# Patient Record
Sex: MALE | Race: BLACK OR AFRICAN AMERICAN | NOT HISPANIC OR LATINO | Employment: STUDENT | URBAN - METROPOLITAN AREA
[De-identification: names, ages, dates, MRNs, and addresses within clinical notes are randomized per-mention and may not be internally consistent; named-entity substitution may affect disease eponyms.]

---

## 2023-08-13 ENCOUNTER — HOSPITAL ENCOUNTER (OUTPATIENT)
Facility: HOSPITAL | Age: 33
Discharge: HOME | DRG: 897 | End: 2023-08-16
Attending: EMERGENCY MEDICINE | Admitting: STUDENT IN AN ORGANIZED HEALTH CARE EDUCATION/TRAINING PROGRAM
Payer: MEDICAID

## 2023-08-13 DIAGNOSIS — F10.939 ALCOHOL WITHDRAWAL: ICD-10-CM

## 2023-08-13 DIAGNOSIS — Z78.9 ALCOHOL USE: Primary | ICD-10-CM

## 2023-08-13 DIAGNOSIS — R07.9 CHEST PAIN: ICD-10-CM

## 2023-08-13 LAB
ALBUMIN SERPL BCP-MCNC: 4.3 G/DL (ref 3.5–5.2)
ALP SERPL-CCNC: 48 U/L (ref 55–135)
ALT SERPL W/O P-5'-P-CCNC: 54 U/L (ref 10–44)
AMMONIA PLAS-SCNC: 45 UMOL/L (ref 10–50)
ANION GAP SERPL CALC-SCNC: 20 MMOL/L (ref 8–16)
AST SERPL-CCNC: 75 U/L (ref 10–40)
BASOPHILS # BLD AUTO: 0.13 K/UL (ref 0–0.2)
BASOPHILS NFR BLD: 1.5 % (ref 0–1.9)
BILIRUB SERPL-MCNC: 0.9 MG/DL (ref 0.1–1)
BUN SERPL-MCNC: 13 MG/DL (ref 6–20)
CALCIUM SERPL-MCNC: 8.6 MG/DL (ref 8.7–10.5)
CHLORIDE SERPL-SCNC: 108 MMOL/L (ref 95–110)
CO2 SERPL-SCNC: 18 MMOL/L (ref 23–29)
CREAT SERPL-MCNC: 1.2 MG/DL (ref 0.5–1.4)
CTP QC/QA: YES
DIFFERENTIAL METHOD: ABNORMAL
EOSINOPHIL # BLD AUTO: 0.1 K/UL (ref 0–0.5)
EOSINOPHIL NFR BLD: 0.6 % (ref 0–8)
ERYTHROCYTE [DISTWIDTH] IN BLOOD BY AUTOMATED COUNT: 13.9 % (ref 11.5–14.5)
EST. GFR  (NO RACE VARIABLE): >60 ML/MIN/1.73 M^2
GLUCOSE SERPL-MCNC: 105 MG/DL (ref 70–110)
HCT VFR BLD AUTO: 43.3 % (ref 40–54)
HGB BLD-MCNC: 14.4 G/DL (ref 14–18)
IMM GRANULOCYTES # BLD AUTO: 0.01 K/UL (ref 0–0.04)
IMM GRANULOCYTES NFR BLD AUTO: 0.1 % (ref 0–0.5)
LYMPHOCYTES # BLD AUTO: 3 K/UL (ref 1–4.8)
LYMPHOCYTES NFR BLD: 34.1 % (ref 18–48)
MAGNESIUM SERPL-MCNC: 2.1 MG/DL (ref 1.6–2.6)
MCH RBC QN AUTO: 31.4 PG (ref 27–31)
MCHC RBC AUTO-ENTMCNC: 33.3 G/DL (ref 32–36)
MCV RBC AUTO: 94 FL (ref 82–98)
MONOCYTES # BLD AUTO: 1.1 K/UL (ref 0.3–1)
MONOCYTES NFR BLD: 12.5 % (ref 4–15)
NEUTROPHILS # BLD AUTO: 4.5 K/UL (ref 1.8–7.7)
NEUTROPHILS NFR BLD: 51.2 % (ref 38–73)
NRBC BLD-RTO: 0 /100 WBC
PLATELET # BLD AUTO: 274 K/UL (ref 150–450)
PMV BLD AUTO: 9.9 FL (ref 9.2–12.9)
POTASSIUM SERPL-SCNC: 4.1 MMOL/L (ref 3.5–5.1)
PROT SERPL-MCNC: 7.4 G/DL (ref 6–8.4)
RBC # BLD AUTO: 4.59 M/UL (ref 4.6–6.2)
SARS-COV-2 RDRP RESP QL NAA+PROBE: NEGATIVE
SODIUM SERPL-SCNC: 146 MMOL/L (ref 136–145)
WBC # BLD AUTO: 8.71 K/UL (ref 3.9–12.7)

## 2023-08-13 PROCEDURE — 82140 ASSAY OF AMMONIA: CPT | Performed by: EMERGENCY MEDICINE

## 2023-08-13 PROCEDURE — 63600175 PHARM REV CODE 636 W HCPCS: Performed by: STUDENT IN AN ORGANIZED HEALTH CARE EDUCATION/TRAINING PROGRAM

## 2023-08-13 PROCEDURE — 25000003 PHARM REV CODE 250: Performed by: STUDENT IN AN ORGANIZED HEALTH CARE EDUCATION/TRAINING PROGRAM

## 2023-08-13 PROCEDURE — 96361 HYDRATE IV INFUSION ADD-ON: CPT

## 2023-08-13 PROCEDURE — 99285 EMERGENCY DEPT VISIT HI MDM: CPT | Mod: 25

## 2023-08-13 PROCEDURE — 63600175 PHARM REV CODE 636 W HCPCS: Performed by: EMERGENCY MEDICINE

## 2023-08-13 PROCEDURE — 99291 CRITICAL CARE FIRST HOUR: CPT

## 2023-08-13 PROCEDURE — 85025 COMPLETE CBC W/AUTO DIFF WBC: CPT | Performed by: EMERGENCY MEDICINE

## 2023-08-13 PROCEDURE — 83735 ASSAY OF MAGNESIUM: CPT | Performed by: EMERGENCY MEDICINE

## 2023-08-13 PROCEDURE — 96374 THER/PROPH/DIAG INJ IV PUSH: CPT

## 2023-08-13 PROCEDURE — G0378 HOSPITAL OBSERVATION PER HR: HCPCS

## 2023-08-13 PROCEDURE — 80053 COMPREHEN METABOLIC PANEL: CPT | Performed by: EMERGENCY MEDICINE

## 2023-08-13 PROCEDURE — 96372 THER/PROPH/DIAG INJ SC/IM: CPT | Performed by: STUDENT IN AN ORGANIZED HEALTH CARE EDUCATION/TRAINING PROGRAM

## 2023-08-13 PROCEDURE — 87635 SARS-COV-2 COVID-19 AMP PRB: CPT | Performed by: EMERGENCY MEDICINE

## 2023-08-13 PROCEDURE — 12000002 HC ACUTE/MED SURGE SEMI-PRIVATE ROOM

## 2023-08-13 PROCEDURE — 96375 TX/PRO/DX INJ NEW DRUG ADDON: CPT

## 2023-08-13 RX ORDER — TALC
6 POWDER (GRAM) TOPICAL NIGHTLY PRN
Status: DISCONTINUED | OUTPATIENT
Start: 2023-08-13 | End: 2023-08-16 | Stop reason: HOSPADM

## 2023-08-13 RX ORDER — SIMETHICONE 80 MG
1 TABLET,CHEWABLE ORAL 4 TIMES DAILY PRN
Status: DISCONTINUED | OUTPATIENT
Start: 2023-08-13 | End: 2023-08-16 | Stop reason: HOSPADM

## 2023-08-13 RX ORDER — LORAZEPAM 1 MG/1
2 TABLET ORAL EVERY 4 HOURS PRN
Status: DISCONTINUED | OUTPATIENT
Start: 2023-08-13 | End: 2023-08-16 | Stop reason: HOSPADM

## 2023-08-13 RX ORDER — HEPARIN SODIUM 5000 [USP'U]/ML
5000 INJECTION, SOLUTION INTRAVENOUS; SUBCUTANEOUS EVERY 8 HOURS
Status: DISCONTINUED | OUTPATIENT
Start: 2023-08-13 | End: 2023-08-16 | Stop reason: HOSPADM

## 2023-08-13 RX ORDER — FOLIC ACID 1 MG/1
1 TABLET ORAL DAILY
Status: DISCONTINUED | OUTPATIENT
Start: 2023-08-14 | End: 2023-08-16 | Stop reason: HOSPADM

## 2023-08-13 RX ORDER — NALOXONE HCL 0.4 MG/ML
0.02 VIAL (ML) INJECTION
Status: DISCONTINUED | OUTPATIENT
Start: 2023-08-13 | End: 2023-08-16 | Stop reason: HOSPADM

## 2023-08-13 RX ORDER — SODIUM CHLORIDE 0.9 % (FLUSH) 0.9 %
10 SYRINGE (ML) INJECTION EVERY 12 HOURS PRN
Status: DISCONTINUED | OUTPATIENT
Start: 2023-08-13 | End: 2023-08-16 | Stop reason: HOSPADM

## 2023-08-13 RX ORDER — POLYETHYLENE GLYCOL 3350 17 G/17G
17 POWDER, FOR SOLUTION ORAL DAILY
Status: DISCONTINUED | OUTPATIENT
Start: 2023-08-14 | End: 2023-08-16 | Stop reason: HOSPADM

## 2023-08-13 RX ORDER — DIAZEPAM 5 MG/1
5 TABLET ORAL EVERY 8 HOURS
Status: DISCONTINUED | OUTPATIENT
Start: 2023-08-13 | End: 2023-08-14

## 2023-08-13 RX ORDER — IBUPROFEN 200 MG
24 TABLET ORAL
Status: DISCONTINUED | OUTPATIENT
Start: 2023-08-13 | End: 2023-08-16 | Stop reason: HOSPADM

## 2023-08-13 RX ORDER — ACETAMINOPHEN 325 MG/1
650 TABLET ORAL EVERY 4 HOURS PRN
Status: DISCONTINUED | OUTPATIENT
Start: 2023-08-13 | End: 2023-08-16 | Stop reason: HOSPADM

## 2023-08-13 RX ORDER — IBUPROFEN 200 MG
16 TABLET ORAL
Status: DISCONTINUED | OUTPATIENT
Start: 2023-08-13 | End: 2023-08-16 | Stop reason: HOSPADM

## 2023-08-13 RX ORDER — LORAZEPAM 2 MG/ML
2 INJECTION INTRAMUSCULAR
Status: DISCONTINUED | OUTPATIENT
Start: 2023-08-13 | End: 2023-08-16 | Stop reason: HOSPADM

## 2023-08-13 RX ORDER — GLUCAGON 1 MG
1 KIT INJECTION
Status: DISCONTINUED | OUTPATIENT
Start: 2023-08-13 | End: 2023-08-16 | Stop reason: HOSPADM

## 2023-08-13 RX ORDER — ONDANSETRON 2 MG/ML
4 INJECTION INTRAMUSCULAR; INTRAVENOUS EVERY 8 HOURS PRN
Status: DISCONTINUED | OUTPATIENT
Start: 2023-08-13 | End: 2023-08-16 | Stop reason: HOSPADM

## 2023-08-13 RX ORDER — LORAZEPAM 2 MG/ML
2 INJECTION INTRAMUSCULAR
Status: COMPLETED | OUTPATIENT
Start: 2023-08-13 | End: 2023-08-13

## 2023-08-13 RX ORDER — THIAMINE HCL 100 MG
100 TABLET ORAL 3 TIMES DAILY
Status: DISCONTINUED | OUTPATIENT
Start: 2023-08-17 | End: 2023-08-16 | Stop reason: HOSPADM

## 2023-08-13 RX ADMIN — SODIUM CHLORIDE, POTASSIUM CHLORIDE, SODIUM LACTATE AND CALCIUM CHLORIDE 1000 ML: 600; 310; 30; 20 INJECTION, SOLUTION INTRAVENOUS at 07:08

## 2023-08-13 RX ADMIN — DIAZEPAM 5 MG: 5 TABLET ORAL at 09:08

## 2023-08-13 RX ADMIN — LORAZEPAM 2 MG: 2 INJECTION INTRAMUSCULAR; INTRAVENOUS at 07:08

## 2023-08-13 RX ADMIN — HEPARIN SODIUM 5000 UNITS: 5000 INJECTION INTRAVENOUS; SUBCUTANEOUS at 09:08

## 2023-08-13 NOTE — Clinical Note
Diagnosis: Alcohol withdrawal [291.81.ICD-9-CM]   Future Attending Provider: ANTONIA CAAL [[id number]]   Admitting Provider:: NALINI LUJAN [[id number]]

## 2023-08-14 PROBLEM — Z78.9 ALCOHOL USE: Status: ACTIVE | Noted: 2023-08-14

## 2023-08-14 PROBLEM — F10.939 ALCOHOL WITHDRAWAL: Chronic | Status: ACTIVE | Noted: 2023-08-14

## 2023-08-14 PROBLEM — F10.90 ALCOHOL USE DISORDER: Status: ACTIVE | Noted: 2023-08-14

## 2023-08-14 PROBLEM — R74.8 ELEVATED LIVER ENZYMES: Status: ACTIVE | Noted: 2023-08-14

## 2023-08-14 PROBLEM — F10.939 ALCOHOL WITHDRAWAL: Status: ACTIVE | Noted: 2023-08-14

## 2023-08-14 PROBLEM — E87.0 HYPERNATREMIA: Status: ACTIVE | Noted: 2023-08-14

## 2023-08-14 PROBLEM — E87.29 METABOLIC ACIDOSIS, INCREASED ANION GAP: Status: ACTIVE | Noted: 2023-08-14

## 2023-08-14 LAB
ALBUMIN SERPL BCP-MCNC: 4 G/DL (ref 3.5–5.2)
ALP SERPL-CCNC: 55 U/L (ref 55–135)
ALT SERPL W/O P-5'-P-CCNC: 72 U/L (ref 10–44)
ANION GAP SERPL CALC-SCNC: 15 MMOL/L (ref 8–16)
AST SERPL-CCNC: 139 U/L (ref 10–40)
BASOPHILS # BLD AUTO: 0.11 K/UL (ref 0–0.2)
BASOPHILS NFR BLD: 1.2 % (ref 0–1.9)
BILIRUB SERPL-MCNC: 1.3 MG/DL (ref 0.1–1)
BUN SERPL-MCNC: 11 MG/DL (ref 6–20)
CALCIUM SERPL-MCNC: 9 MG/DL (ref 8.7–10.5)
CHLORIDE SERPL-SCNC: 106 MMOL/L (ref 95–110)
CO2 SERPL-SCNC: 23 MMOL/L (ref 23–29)
CREAT SERPL-MCNC: 1.1 MG/DL (ref 0.5–1.4)
DIFFERENTIAL METHOD: ABNORMAL
EOSINOPHIL # BLD AUTO: 0.2 K/UL (ref 0–0.5)
EOSINOPHIL NFR BLD: 2.5 % (ref 0–8)
ERYTHROCYTE [DISTWIDTH] IN BLOOD BY AUTOMATED COUNT: 13.6 % (ref 11.5–14.5)
EST. GFR  (NO RACE VARIABLE): >60 ML/MIN/1.73 M^2
GLUCOSE SERPL-MCNC: 83 MG/DL (ref 70–110)
HCT VFR BLD AUTO: 41.8 % (ref 40–54)
HGB BLD-MCNC: 14 G/DL (ref 14–18)
IMM GRANULOCYTES # BLD AUTO: 0.01 K/UL (ref 0–0.04)
IMM GRANULOCYTES NFR BLD AUTO: 0.1 % (ref 0–0.5)
LYMPHOCYTES # BLD AUTO: 3 K/UL (ref 1–4.8)
LYMPHOCYTES NFR BLD: 33.6 % (ref 18–48)
MCH RBC QN AUTO: 31.2 PG (ref 27–31)
MCHC RBC AUTO-ENTMCNC: 33.5 G/DL (ref 32–36)
MCV RBC AUTO: 93 FL (ref 82–98)
MONOCYTES # BLD AUTO: 1 K/UL (ref 0.3–1)
MONOCYTES NFR BLD: 11.2 % (ref 4–15)
NEUTROPHILS # BLD AUTO: 4.6 K/UL (ref 1.8–7.7)
NEUTROPHILS NFR BLD: 51.4 % (ref 38–73)
NRBC BLD-RTO: 0 /100 WBC
PLATELET # BLD AUTO: 273 K/UL (ref 150–450)
PMV BLD AUTO: 10 FL (ref 9.2–12.9)
POTASSIUM SERPL-SCNC: 3.6 MMOL/L (ref 3.5–5.1)
PROT SERPL-MCNC: 6.9 G/DL (ref 6–8.4)
RBC # BLD AUTO: 4.49 M/UL (ref 4.6–6.2)
SODIUM SERPL-SCNC: 144 MMOL/L (ref 136–145)
WBC # BLD AUTO: 8.92 K/UL (ref 3.9–12.7)

## 2023-08-14 PROCEDURE — 25000003 PHARM REV CODE 250: Performed by: STUDENT IN AN ORGANIZED HEALTH CARE EDUCATION/TRAINING PROGRAM

## 2023-08-14 PROCEDURE — 96366 THER/PROPH/DIAG IV INF ADDON: CPT

## 2023-08-14 PROCEDURE — 96372 THER/PROPH/DIAG INJ SC/IM: CPT | Mod: 59 | Performed by: STUDENT IN AN ORGANIZED HEALTH CARE EDUCATION/TRAINING PROGRAM

## 2023-08-14 PROCEDURE — 99291 CRITICAL CARE FIRST HOUR: CPT

## 2023-08-14 PROCEDURE — 99205 OFFICE O/P NEW HI 60 MIN: CPT | Mod: 95,AF,HB, | Performed by: PSYCHIATRY & NEUROLOGY

## 2023-08-14 PROCEDURE — 63600175 PHARM REV CODE 636 W HCPCS: Performed by: STUDENT IN AN ORGANIZED HEALTH CARE EDUCATION/TRAINING PROGRAM

## 2023-08-14 PROCEDURE — 80053 COMPREHEN METABOLIC PANEL: CPT | Performed by: STUDENT IN AN ORGANIZED HEALTH CARE EDUCATION/TRAINING PROGRAM

## 2023-08-14 PROCEDURE — 96372 THER/PROPH/DIAG INJ SC/IM: CPT | Performed by: STUDENT IN AN ORGANIZED HEALTH CARE EDUCATION/TRAINING PROGRAM

## 2023-08-14 PROCEDURE — 96376 TX/PRO/DX INJ SAME DRUG ADON: CPT

## 2023-08-14 PROCEDURE — 99205 PR OFFICE/OUTPT VISIT, NEW, LEVL V, 60-74 MIN: ICD-10-PCS | Mod: 95,AF,HB, | Performed by: PSYCHIATRY & NEUROLOGY

## 2023-08-14 PROCEDURE — G0378 HOSPITAL OBSERVATION PER HR: HCPCS

## 2023-08-14 PROCEDURE — 11000001 HC ACUTE MED/SURG PRIVATE ROOM

## 2023-08-14 PROCEDURE — 96365 THER/PROPH/DIAG IV INF INIT: CPT

## 2023-08-14 PROCEDURE — S4991 NICOTINE PATCH NONLEGEND: HCPCS | Performed by: STUDENT IN AN ORGANIZED HEALTH CARE EDUCATION/TRAINING PROGRAM

## 2023-08-14 PROCEDURE — 85025 COMPLETE CBC W/AUTO DIFF WBC: CPT | Performed by: STUDENT IN AN ORGANIZED HEALTH CARE EDUCATION/TRAINING PROGRAM

## 2023-08-14 RX ORDER — IBUPROFEN 200 MG
1 TABLET ORAL DAILY
Status: DISCONTINUED | OUTPATIENT
Start: 2023-08-14 | End: 2023-08-16 | Stop reason: HOSPADM

## 2023-08-14 RX ORDER — POTASSIUM CHLORIDE 20 MEQ/1
40 TABLET, EXTENDED RELEASE ORAL ONCE
Status: COMPLETED | OUTPATIENT
Start: 2023-08-14 | End: 2023-08-14

## 2023-08-14 RX ORDER — DIAZEPAM 2 MG/1
2 TABLET ORAL EVERY 8 HOURS
Status: DISCONTINUED | OUTPATIENT
Start: 2023-08-20 | End: 2023-08-16 | Stop reason: HOSPADM

## 2023-08-14 RX ORDER — DIAZEPAM 5 MG/1
10 TABLET ORAL EVERY 8 HOURS
Status: DISCONTINUED | OUTPATIENT
Start: 2023-08-14 | End: 2023-08-16 | Stop reason: HOSPADM

## 2023-08-14 RX ORDER — DIAZEPAM 5 MG/1
5 TABLET ORAL EVERY 8 HOURS
Status: DISCONTINUED | OUTPATIENT
Start: 2023-08-17 | End: 2023-08-16 | Stop reason: HOSPADM

## 2023-08-14 RX ADMIN — HEPARIN SODIUM 5000 UNITS: 5000 INJECTION INTRAVENOUS; SUBCUTANEOUS at 09:08

## 2023-08-14 RX ADMIN — HEPARIN SODIUM 5000 UNITS: 5000 INJECTION INTRAVENOUS; SUBCUTANEOUS at 05:08

## 2023-08-14 RX ADMIN — FOLIC ACID 1 MG: 1 TABLET ORAL at 11:08

## 2023-08-14 RX ADMIN — POLYETHYLENE GLYCOL 3350 17 G: 17 POWDER, FOR SOLUTION ORAL at 11:08

## 2023-08-14 RX ADMIN — NICOTINE 1 PATCH: 21 PATCH, EXTENDED RELEASE TRANSDERMAL at 12:08

## 2023-08-14 RX ADMIN — DIAZEPAM 5 MG: 5 TABLET ORAL at 05:08

## 2023-08-14 RX ADMIN — DIAZEPAM 10 MG: 5 TABLET ORAL at 09:08

## 2023-08-14 RX ADMIN — DIAZEPAM 10 MG: 5 TABLET ORAL at 03:08

## 2023-08-14 RX ADMIN — POTASSIUM CHLORIDE 40 MEQ: 1500 TABLET, EXTENDED RELEASE ORAL at 12:08

## 2023-08-14 RX ADMIN — THERA TABS 1 TABLET: TAB at 11:08

## 2023-08-14 RX ADMIN — THIAMINE HYDROCHLORIDE 250 MG: 100 INJECTION, SOLUTION INTRAMUSCULAR; INTRAVENOUS at 11:08

## 2023-08-14 RX ADMIN — HEPARIN SODIUM 5000 UNITS: 5000 INJECTION INTRAVENOUS; SUBCUTANEOUS at 03:08

## 2023-08-14 RX ADMIN — LORAZEPAM 2 MG: 2 INJECTION INTRAMUSCULAR; INTRAVENOUS at 01:08

## 2023-08-14 RX ADMIN — LORAZEPAM 2 MG: 2 INJECTION INTRAMUSCULAR; INTRAVENOUS at 12:08

## 2023-08-14 NOTE — ED NOTES
RN responded to pts call light. Pt requesting to urinate. RN noted pts tremors to have returned. RN will perform ciwa. Urinal provided.

## 2023-08-14 NOTE — ED PROVIDER NOTES
"Encounter Date: 8/13/2023       History     Chief Complaint   Patient presents with    Delirium Tremens (DTS)     To ed via [facility] from [facility] for eval of tremors and "the shakes". Pt reports daily etoh use. States last drink was this morning. Pt given 1L NS by EMS PTA.      32-year-old male brought in by EMS with tremors.  Patient says that he last drank alcohol around 6 or 7 this morning.  Has not had anything to drink since.  He reports he usually drinks heavily throughout the day, over 1/5 of alcohol daily. Complains of diffuse tremors, nausea, vomiting.  Denies any other drug use.  Denies a prior history of seizures.  Denies any falls or trauma.  Says that he was in rehab most recently a few years ago, currently is interested in quitting.  Says he stopped drinking today because his "body could not take it anymore."    The history is provided by the EMS personnel and the patient. No  was used.     Review of patient's allergies indicates:  No Known Allergies  No past medical history on file.  No past surgical history on file.  No family history on file.     Review of Systems    Physical Exam     Initial Vitals [08/13/23 1838]   BP Pulse Resp Temp SpO2   131/88 80 18 98.2 °F (36.8 °C) 100 %      MAP       --         Physical Exam    Constitutional: He appears well-developed and well-nourished. No distress.   HENT:   Head: Normocephalic and atraumatic.   Eyes: Conjunctivae and EOM are normal. Pupils are equal, round, and reactive to light.   Neck: Neck supple.   Normal range of motion.  Cardiovascular:  Normal rate.           Pulmonary/Chest: No respiratory distress.   Abdominal: Abdomen is soft. He exhibits no distension. There is no abdominal tenderness.   Musculoskeletal:         General: Normal range of motion.      Cervical back: Normal range of motion and neck supple.     Neurological: He is alert and oriented to person, place, and time.   Diffuse tremors   Skin: Skin is warm and dry. "         ED Course   Critical Care    Date/Time: 8/13/2023 7:54 PM    Performed by: Angelina Brink MD  Authorized by: Angelina Brink MD  Total critical care time (exclusive of procedural time) : 30 minutes  Critical care time was exclusive of separately billable procedures and treating other patients.  Critical care was necessary to treat or prevent imminent or life-threatening deterioration of the following conditions: toxidrome.  Critical care was time spent personally by me on the following activities: development of treatment plan with patient or surrogate, interpretation of cardiac output measurements, evaluation of patient's response to treatment, examination of patient, obtaining history from patient or surrogate, ordering and performing treatments and interventions, ordering and review of laboratory studies, ordering and review of radiographic studies, pulse oximetry, re-evaluation of patient's condition and review of old charts.        Labs Reviewed   CBC W/ AUTO DIFFERENTIAL - Abnormal; Notable for the following components:       Result Value    RBC 4.59 (*)     MCH 31.4 (*)     Mono # 1.1 (*)     All other components within normal limits   COMPREHENSIVE METABOLIC PANEL - Abnormal; Notable for the following components:    Sodium 146 (*)     CO2 18 (*)     Calcium 8.6 (*)     Alkaline Phosphatase 48 (*)     AST 75 (*)     ALT 54 (*)     Anion Gap 20 (*)     All other components within normal limits   MAGNESIUM   AMMONIA   SARS-COV-2 RDRP GENE          Imaging Results    None          Medications   lactated ringers bolus 1,000 mL (1,000 mLs Intravenous New Bag 8/13/23 1911)   LORazepam injection 2 mg (2 mg Intravenous Given 8/13/23 1902)     Medical Decision Making:   Differential Diagnosis:   Alcohol withdrawal, LITO, dehydration,   Clinical Tests:   Lab Tests: Ordered and Reviewed  ED Management:  32-year-old male with past medical history of heavy alcohol use brought in by EMS with diffuse  tremors, last drank about 12 hours prior to arrival.  Given IV Ativan with improvement in symptoms.  No acute lab abnormalities.  Discussed possibility of outpatient Serax taper however patient was concerned this will not work for him.  He does report a very high daily alcohol use and so would benefit from inpatient stay.  Reports prior inpatient stays for detox.  Discussed with [facility] Medicine service and admitted for further management.  Other:   I have discussed this case with another health care provider.             ED Course as of 08/13/23 1956   Sun Aug 13, 2023   1910 WBC: 8.71  Normal [AT]   1925 Improved after IV Ativan [AT]   1939 Ammonia: 45  Normal  [AT]   1939 Hemoglobin: 14.4  Normal  [AT]   1956 Potassium: 4.1  Normal  [AT]      ED Course User Index  [AT] Angelina Brink MD                   Clinical Impression:   Final diagnoses:  [F10.939] Alcohol withdrawal        ED Disposition Condition    Observation                 Angelina Brink MD  08/13/23 1956

## 2023-08-14 NOTE — ASSESSMENT & PLAN NOTE
Patient with alcohol use disorder since college.  Reports several bingeing episodes.  Patient wishes to stop    Plan:  Consider Psychology outpatient referral  Consider trying naltrexone inpatient

## 2023-08-14 NOTE — ED NOTES
Hourly rounding performed on pt. Pt is resting comfortably, aao x 4. Pt in NAD, VSS, REU. Pt connected to cardiac monitoring, automatic bp cuff, and continuous pulse ox. Bed in lowest locked position, call bell within reach, side rails up x 2. Pt voices no further needs at this time, will continue to monitor.

## 2023-08-14 NOTE — SUBJECTIVE & OBJECTIVE
No past medical history on file.    No past surgical history on file.    Review of patient's allergies indicates:  No Known Allergies    No current facility-administered medications on file prior to encounter.     No current outpatient medications on file prior to encounter.     Family History    None       Tobacco Use    Smoking status: Not on file    Smokeless tobacco: Not on file   Substance and Sexual Activity    Alcohol use: Not on file    Drug use: Not on file    Sexual activity: Not on file     Review of Systems   Constitutional:  Negative for chills and diaphoresis.   HENT:  Negative for dental problem and drooling.    Eyes:  Negative for pain and itching.   Respiratory:  Negative for shortness of breath and stridor.    Cardiovascular:  Negative for palpitations and leg swelling.   Gastrointestinal:  Negative for anal bleeding and blood in stool.   Endocrine: Negative for heat intolerance and polydipsia.   Genitourinary:  Negative for enuresis and flank pain.   Musculoskeletal:  Negative for back pain and gait problem.   Skin:  Negative for pallor and rash.   Allergic/Immunologic: Negative for immunocompromised state.   Neurological:  Positive for tremors. Negative for syncope and light-headedness.   Psychiatric/Behavioral:  Negative for confusion and decreased concentration.      Objective:     Vital Signs (Most Recent):  Temp: 98.2 °F (36.8 °C) (08/13/23 1838)  Pulse: 74 (08/14/23 0309)  Resp: 17 (08/14/23 0309)  BP: (!) 149/93 (08/14/23 0301)  SpO2: 95 % (08/14/23 0309) Vital Signs (24h Range):  Temp:  [98.2 °F (36.8 °C)] 98.2 °F (36.8 °C)  Pulse:  [66-90] 74  Resp:  [16-20] 17  SpO2:  [94 %-100 %] 95 %  BP: (112-149)/(66-93) 149/93     Weight: 90.7 kg (200 lb)  Body mass index is 29.53 kg/m².     Physical Exam  Vitals reviewed.   HENT:      Head: Normocephalic.      Right Ear: There is no impacted cerumen.      Left Ear: There is no impacted cerumen.      Nose: No congestion or rhinorrhea.       "Mouth/Throat:      Pharynx: No oropharyngeal exudate or posterior oropharyngeal erythema.   Eyes:      General:         Right eye: No discharge.         Left eye: No discharge.   Cardiovascular:      Rate and Rhythm: Normal rate.      Heart sounds: No murmur heard.  Pulmonary:      Effort: No respiratory distress.      Breath sounds: No wheezing.   Abdominal:      General: There is no distension.      Tenderness: There is no abdominal tenderness.   Musculoskeletal:         General: No deformity.      Cervical back: No rigidity.      Right lower leg: No edema.   Lymphadenopathy:      Cervical: No cervical adenopathy.   Skin:     Findings: No bruising or lesion.   Neurological:      Mental Status: He is alert.      Cranial Nerves: No cranial nerve deficit.      Motor: No weakness.   Psychiatric:         Mood and Affect: Mood normal.                Significant Labs: All pertinent labs within the past 24 hours have been reviewed.  Blood Culture: No results for input(s): "LABBLOO" in the last 48 hours.  CBC:   Recent Labs   Lab 08/13/23  1900   WBC 8.71   HGB 14.4   HCT 43.3        CMP:   Recent Labs   Lab 08/13/23  1900   *   K 4.1      CO2 18*      BUN 13   CREATININE 1.2   CALCIUM 8.6*   PROT 7.4   ALBUMIN 4.3   BILITOT 0.9   ALKPHOS 48*   AST 75*   ALT 54*   ANIONGAP 20*     Lactic Acid: No results for input(s): "LACTATE" in the last 48 hours.  Lipase: No results for input(s): "LIPASE" in the last 48 hours.  Lipid Panel: No results for input(s): "CHOL", "HDL", "LDLCALC", "TRIG", "CHOLHDL" in the last 48 hours.  POCT Glucose: No results for input(s): "POCTGLUCOSE" in the last 48 hours.  Troponin: No results for input(s): "TROPONINI", "TROPONINIHS" in the last 48 hours.    Significant Imaging: I have reviewed all pertinent imaging results/findings within the past 24 hours.  I have reviewed and interpreted all pertinent imaging results/findings within the past 24 hours.  "
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
## 2023-08-14 NOTE — H&P
"[facility] Medicine  History & Physical    Patient Name: Valencia Solorzano  MRN: [id number]  Patient Class: OP- Observation  Admission Date: 8/13/2023  Attending Physician: Caroline Thakur MD   Primary Care Provider: Luisa Primary Doctor         Patient information was obtained from patient and ER records.     Subjective:     Principal Problem:Alcohol withdrawal    Chief Complaint:   Chief Complaint   Patient presents with    Delirium Tremens (DTS)     To ed via [facility] from [facility] for eval of tremors and "the shakes". Pt reports daily etoh use. States last drink was this morning. Pt given 1L NS by EMS PTA.         HPI: Valencia Solorzano is a 32 yr male with alcohol use disorder who presents with tremors.    Patient states that his last drink was this morning.  He reports attempting to stop drinking several times over the past few years.  He drinks nearly a 5th of hard liquor a day.  He is currently complaining of nausea.  Patient often gets tremors when he attempts to stop although he has never had a seizure.  He states he wants to stop since he is hurting his body.  He comes in for further evaluation and care.    In the ED, triage vitals were unremarkable.  CBC showed no leukocytosis.  CMP was significant for hypernatremia, anion gap of 20.  Alk-phos was elevated as well as AST and ALT.    Medicine was consulted for patient's alcohol withdrawals.      No past medical history on file.    No past surgical history on file.    Review of patient's allergies indicates:  No Known Allergies    No current facility-administered medications on file prior to encounter.     No current outpatient medications on file prior to encounter.     Family History    None       Tobacco Use    Smoking status: Not on file    Smokeless tobacco: Not on file   Substance and Sexual Activity    Alcohol use: Not on file    Drug use: Not on file    Sexual activity: Not on file     Review of Systems   Constitutional:  " Negative for chills and diaphoresis.   HENT:  Negative for dental problem and drooling.    Eyes:  Negative for pain and itching.   Respiratory:  Negative for shortness of breath and stridor.    Cardiovascular:  Negative for palpitations and leg swelling.   Gastrointestinal:  Negative for anal bleeding and blood in stool.   Endocrine: Negative for heat intolerance and polydipsia.   Genitourinary:  Negative for enuresis and flank pain.   Musculoskeletal:  Negative for back pain and gait problem.   Skin:  Negative for pallor and rash.   Allergic/Immunologic: Negative for immunocompromised state.   Neurological:  Positive for tremors. Negative for syncope and light-headedness.   Psychiatric/Behavioral:  Negative for confusion and decreased concentration.      Objective:     Vital Signs (Most Recent):  Temp: 98.2 °F (36.8 °C) (08/13/23 1838)  Pulse: 74 (08/14/23 0309)  Resp: 17 (08/14/23 0309)  BP: (!) 149/93 (08/14/23 0301)  SpO2: 95 % (08/14/23 0309) Vital Signs (24h Range):  Temp:  [98.2 °F (36.8 °C)] 98.2 °F (36.8 °C)  Pulse:  [66-90] 74  Resp:  [16-20] 17  SpO2:  [94 %-100 %] 95 %  BP: (112-149)/(66-93) 149/93     Weight: 90.7 kg (200 lb)  Body mass index is 29.53 kg/m².     Physical Exam  Vitals reviewed.   HENT:      Head: Normocephalic.      Right Ear: There is no impacted cerumen.      Left Ear: There is no impacted cerumen.      Nose: No congestion or rhinorrhea.      Mouth/Throat:      Pharynx: No oropharyngeal exudate or posterior oropharyngeal erythema.   Eyes:      General:         Right eye: No discharge.         Left eye: No discharge.   Cardiovascular:      Rate and Rhythm: Normal rate.      Heart sounds: No murmur heard.  Pulmonary:      Effort: No respiratory distress.      Breath sounds: No wheezing.   Abdominal:      General: There is no distension.      Tenderness: There is no abdominal tenderness.   Musculoskeletal:         General: No deformity.      Cervical back: No rigidity.      Right lower  "leg: No edema.   Lymphadenopathy:      Cervical: No cervical adenopathy.   Skin:     Findings: No bruising or lesion.   Neurological:      Mental Status: He is alert.      Cranial Nerves: No cranial nerve deficit.      Motor: No weakness.   Psychiatric:         Mood and Affect: Mood normal.                Significant Labs: All pertinent labs within the past 24 hours have been reviewed.  Blood Culture: No results for input(s): "LABBLOO" in the last 48 hours.  CBC:   Recent Labs   Lab 08/13/23  1900   WBC 8.71   HGB 14.4   HCT 43.3        CMP:   Recent Labs   Lab 08/13/23  1900   *   K 4.1      CO2 18*      BUN 13   CREATININE 1.2   CALCIUM 8.6*   PROT 7.4   ALBUMIN 4.3   BILITOT 0.9   ALKPHOS 48*   AST 75*   ALT 54*   ANIONGAP 20*     Lactic Acid: No results for input(s): "LACTATE" in the last 48 hours.  Lipase: No results for input(s): "LIPASE" in the last 48 hours.  Lipid Panel: No results for input(s): "CHOL", "HDL", "LDLCALC", "TRIG", "CHOLHDL" in the last 48 hours.  POCT Glucose: No results for input(s): "POCTGLUCOSE" in the last 48 hours.  Troponin: No results for input(s): "TROPONINI", "TROPONINIHS" in the last 48 hours.    Significant Imaging: I have reviewed all pertinent imaging results/findings within the past 24 hours.  I have reviewed and interpreted all pertinent imaging results/findings within the past 24 hours.    Assessment/Plan:     * Alcohol withdrawal  Patient reports several episodes of tremors during alcohol withdrawals  Denies any past seizures  Truly wishes to stop    Plan:  Start CIWA protocol with scheduled Valium and IV Ativan pushes for CIWA greater than 8  Daily multivitamin and thiamine to prevent Wernicke's encephalopathy      Elevated liver enzymes  AST:  75   ALT: 54  Alk-phos 48    Plan:  Daily CMP   Liver ultrasound        Metabolic acidosis, increased anion gap  Anion gap of 20    Plan:  Encourage good oral intake  Give fluids  CMP " daily      Hypernatremia  Sodium:  146    Plan:  Order 100 cc for 20 hours      Alcohol use disorder  Patient with alcohol use disorder since college.  Reports several bingeing episodes.  Patient wishes to stop    Plan:  Consider Psychology outpatient referral  Consider trying naltrexone inpatient        VTE Risk Mitigation (From admission, onward)         Ordered     heparin (porcine) injection 5,000 Units  Every 8 hours         08/13/23 2034     IP VTE HIGH RISK PATIENT  Once         08/13/23 2034     Place sequential compression device  Until discontinued         08/13/23 2034                   On 08/14/2023, patient should be placed in hospital observation services under my care.        Ish Lazo MD  Department of Hospital Medicine  [facility] - Emergency Dept

## 2023-08-14 NOTE — ED NOTES
Pts mother called. Explained to mother can not confirm or deny pt is present in ER at this time. Took mother's phone #. Verified with pt that mother can be notified he is in the ER and pt agreed.     - Nia [phone].     Notified PUJA Curtis of pt mother calling

## 2023-08-14 NOTE — ASSESSMENT & PLAN NOTE
Patient reports several episodes of tremors during alcohol withdrawals  Denies any past seizures  Truly wishes to stop    Plan:  Start CIWA protocol with scheduled Valium and IV Ativan pushes for CIWA greater than 8  Daily multivitamin and thiamine to prevent Wernicke's encephalopathy

## 2023-08-14 NOTE — HPI
Valencia Solorzano is a 32 yr male with alcohol use disorder who presents with tremors.    Patient states that his last drink was this morning.  He reports attempting to stop drinking several times over the past few years.  He drinks nearly a 5th of hard liquor a day.  He is currently complaining of nausea.  Patient often gets tremors when he attempts to stop although he has never had a seizure.  He states he wants to stop since he is hurting his body.  He comes in for further evaluation and care.    In the ED, triage vitals were unremarkable.  CBC showed no leukocytosis.  CMP was significant for hypernatremia, anion gap of 20.  Alk-phos was elevated as well as AST and ALT.    Medicine was consulted for patient's alcohol withdrawals.

## 2023-08-14 NOTE — PHARMACY MED REC
"  [facility]           Pharmacy  Admission Medication History     The home medication history was taken by Tamara Mendez.      Medication history obtained from Medications listed below were obtained from: Patient/family.Patient not taking any medications    Based on information gathered for medication list, you may go to "Admission" then "Reconcile Home Medications" tabs to review and/or act upon those items.     The home medication list has been updated by the Pharmacy department.   Please read ALL comments highlighted in yellow.   Please address this information as you see fit.    Feel free to contact us if you have any questions or require assistance.        No current facility-administered medications on file prior to encounter.     No current outpatient medications on file prior to encounter.       Please address this information as you see fit.  Feel free to contact us if you have any questions or require assistance.    Tamara Mendez  [phone]                .          "

## 2023-08-14 NOTE — PLAN OF CARE
08/14/23 1706   Admission   Initial VN Admission Questions Complete   Communication Issues? None   Shift   Virtual Nurse - Patient Verbalized Approval Of Camera Use;VN Rounding   Safety/Activity   Patient Rounds bed in low position;placement of personal items at bedside;call light in patient/parent reach;toileting offered;visualized patient;clutter free environment maintained   Safety Promotion/Fall Prevention assistive device/personal item within reach;Fall Risk reviewed with patient/family;side rails raised x 2;instructed to call staff for mobility   Positioning   Head of Bed (HOB) Positioning HOB at 30 degrees   Pain/Comfort/Sleep   Preferred Pain Scale number (Numeric Rating Pain Scale)   Pain Rating (0-10): Rest 0     VN cued into patient's room for introduction with patient's permission.  VN role explained and informed patient that VN would be working with bedside nurse and rest of care team.  Plan of care reviewed, pt's scheduled and prn meds reviewed.  Instructed pt to call for assistance,  Patient's chart, labs and vital signs reviewed.  Allowed time for questions, emotional support provided, pt wishes to discuss poc with mother present,  Pt denies pain, anxiety or nausea. Will continue to be available as needed.

## 2023-08-14 NOTE — ED NOTES
Pt is resting calmly and comfortably. VSS. Pt in NAD. Pt connected to cardiac monitoring, automatic bp cuff, and cont pulse ox. Pt voices no needs at this time. Will continue to monitor.

## 2023-08-14 NOTE — ED NOTES
RN assumed care of pt. Pt in stretcher, RN at the bedside. Pt is visibly tremoring. Pt aao x 4. Will get ativan. Pt has liter of NS infusing from ems. Pt connected to cardiac monitoring, automatic bp cuff, and cont pulse ox. Pt requesting his mother be contacted and for water.

## 2023-08-15 PROBLEM — E87.0 HYPERNATREMIA: Status: RESOLVED | Noted: 2023-08-14 | Resolved: 2023-08-15

## 2023-08-15 PROBLEM — E87.29 METABOLIC ACIDOSIS, INCREASED ANION GAP: Status: RESOLVED | Noted: 2023-08-14 | Resolved: 2023-08-15

## 2023-08-15 LAB
ALBUMIN SERPL BCP-MCNC: 3.7 G/DL (ref 3.5–5.2)
ALP SERPL-CCNC: 50 U/L (ref 55–135)
ALT SERPL W/O P-5'-P-CCNC: 76 U/L (ref 10–44)
ANION GAP SERPL CALC-SCNC: 9 MMOL/L (ref 8–16)
AST SERPL-CCNC: 90 U/L (ref 10–40)
BASOPHILS # BLD AUTO: 0.06 K/UL (ref 0–0.2)
BASOPHILS NFR BLD: 0.8 % (ref 0–1.9)
BILIRUB SERPL-MCNC: 0.9 MG/DL (ref 0.1–1)
BUN SERPL-MCNC: 9 MG/DL (ref 6–20)
CALCIUM SERPL-MCNC: 9 MG/DL (ref 8.7–10.5)
CHLORIDE SERPL-SCNC: 105 MMOL/L (ref 95–110)
CO2 SERPL-SCNC: 24 MMOL/L (ref 23–29)
CREAT SERPL-MCNC: 1.1 MG/DL (ref 0.5–1.4)
DIFFERENTIAL METHOD: ABNORMAL
EOSINOPHIL # BLD AUTO: 0.2 K/UL (ref 0–0.5)
EOSINOPHIL NFR BLD: 2.6 % (ref 0–8)
ERYTHROCYTE [DISTWIDTH] IN BLOOD BY AUTOMATED COUNT: 12.8 % (ref 11.5–14.5)
EST. GFR  (NO RACE VARIABLE): >60 ML/MIN/1.73 M^2
GLUCOSE SERPL-MCNC: 104 MG/DL (ref 70–110)
HCT VFR BLD AUTO: 38.9 % (ref 40–54)
HGB BLD-MCNC: 13.2 G/DL (ref 14–18)
IMM GRANULOCYTES # BLD AUTO: 0.02 K/UL (ref 0–0.04)
IMM GRANULOCYTES NFR BLD AUTO: 0.3 % (ref 0–0.5)
LYMPHOCYTES # BLD AUTO: 2.8 K/UL (ref 1–4.8)
LYMPHOCYTES NFR BLD: 35.8 % (ref 18–48)
MCH RBC QN AUTO: 31.4 PG (ref 27–31)
MCHC RBC AUTO-ENTMCNC: 33.9 G/DL (ref 32–36)
MCV RBC AUTO: 92 FL (ref 82–98)
MONOCYTES # BLD AUTO: 0.7 K/UL (ref 0.3–1)
MONOCYTES NFR BLD: 9.2 % (ref 4–15)
NEUTROPHILS # BLD AUTO: 4 K/UL (ref 1.8–7.7)
NEUTROPHILS NFR BLD: 51.3 % (ref 38–73)
NRBC BLD-RTO: 0 /100 WBC
PLATELET # BLD AUTO: 236 K/UL (ref 150–450)
PMV BLD AUTO: 10.4 FL (ref 9.2–12.9)
POTASSIUM SERPL-SCNC: 3.7 MMOL/L (ref 3.5–5.1)
PROT SERPL-MCNC: 6.5 G/DL (ref 6–8.4)
RBC # BLD AUTO: 4.21 M/UL (ref 4.6–6.2)
SODIUM SERPL-SCNC: 138 MMOL/L (ref 136–145)
WBC # BLD AUTO: 7.84 K/UL (ref 3.9–12.7)

## 2023-08-15 PROCEDURE — 99223 1ST HOSP IP/OBS HIGH 75: CPT | Mod: AF,HB,, | Performed by: PSYCHIATRY & NEUROLOGY

## 2023-08-15 PROCEDURE — 96372 THER/PROPH/DIAG INJ SC/IM: CPT | Performed by: STUDENT IN AN ORGANIZED HEALTH CARE EDUCATION/TRAINING PROGRAM

## 2023-08-15 PROCEDURE — 63600175 PHARM REV CODE 636 W HCPCS: Performed by: STUDENT IN AN ORGANIZED HEALTH CARE EDUCATION/TRAINING PROGRAM

## 2023-08-15 PROCEDURE — 36415 COLL VENOUS BLD VENIPUNCTURE: CPT | Performed by: STUDENT IN AN ORGANIZED HEALTH CARE EDUCATION/TRAINING PROGRAM

## 2023-08-15 PROCEDURE — 80053 COMPREHEN METABOLIC PANEL: CPT | Performed by: STUDENT IN AN ORGANIZED HEALTH CARE EDUCATION/TRAINING PROGRAM

## 2023-08-15 PROCEDURE — 99223 PR INITIAL HOSPITAL CARE,LEVL III: ICD-10-PCS | Mod: AF,HB,, | Performed by: PSYCHIATRY & NEUROLOGY

## 2023-08-15 PROCEDURE — S4991 NICOTINE PATCH NONLEGEND: HCPCS | Performed by: STUDENT IN AN ORGANIZED HEALTH CARE EDUCATION/TRAINING PROGRAM

## 2023-08-15 PROCEDURE — 90833 PSYTX W PT W E/M 30 MIN: CPT | Mod: AF,HB,, | Performed by: PSYCHIATRY & NEUROLOGY

## 2023-08-15 PROCEDURE — G0378 HOSPITAL OBSERVATION PER HR: HCPCS

## 2023-08-15 PROCEDURE — 11000001 HC ACUTE MED/SURG PRIVATE ROOM

## 2023-08-15 PROCEDURE — 90833 PR PSYCHOTHERAPY W/PATIENT W/E&M, 30 MIN (ADD ON): ICD-10-PCS | Mod: AF,HB,, | Performed by: PSYCHIATRY & NEUROLOGY

## 2023-08-15 PROCEDURE — 85025 COMPLETE CBC W/AUTO DIFF WBC: CPT | Performed by: STUDENT IN AN ORGANIZED HEALTH CARE EDUCATION/TRAINING PROGRAM

## 2023-08-15 PROCEDURE — 96366 THER/PROPH/DIAG IV INF ADDON: CPT

## 2023-08-15 PROCEDURE — 25000003 PHARM REV CODE 250: Performed by: STUDENT IN AN ORGANIZED HEALTH CARE EDUCATION/TRAINING PROGRAM

## 2023-08-15 RX ADMIN — HEPARIN SODIUM 5000 UNITS: 5000 INJECTION INTRAVENOUS; SUBCUTANEOUS at 09:08

## 2023-08-15 RX ADMIN — DIAZEPAM 10 MG: 5 TABLET ORAL at 05:08

## 2023-08-15 RX ADMIN — THIAMINE HYDROCHLORIDE 250 MG: 100 INJECTION, SOLUTION INTRAMUSCULAR; INTRAVENOUS at 09:08

## 2023-08-15 RX ADMIN — HEPARIN SODIUM 5000 UNITS: 5000 INJECTION INTRAVENOUS; SUBCUTANEOUS at 05:08

## 2023-08-15 RX ADMIN — FOLIC ACID 1 MG: 1 TABLET ORAL at 09:08

## 2023-08-15 RX ADMIN — DIAZEPAM 10 MG: 5 TABLET ORAL at 09:08

## 2023-08-15 RX ADMIN — DIAZEPAM 10 MG: 5 TABLET ORAL at 02:08

## 2023-08-15 RX ADMIN — THERA TABS 1 TABLET: TAB at 09:08

## 2023-08-15 RX ADMIN — NICOTINE 1 PATCH: 21 PATCH, EXTENDED RELEASE TRANSDERMAL at 10:08

## 2023-08-15 RX ADMIN — HEPARIN SODIUM 5000 UNITS: 5000 INJECTION INTRAVENOUS; SUBCUTANEOUS at 02:08

## 2023-08-15 NOTE — PROGRESS NOTES
[facility] Medicine  Progress Note    Patient Name: Valencia Solorzano  MRN: [id number]  Patient Class: IP- Inpatient   Admission Date: 8/13/2023  Length of Stay: 1 days  Attending Physician: Joseph Travis,*  Primary Care Provider: Luisa, Primary Doctor        Subjective:     Principal Problem:Alcohol withdrawal        HPI:  Valencia Solorzano is a 32 yr male with alcohol use disorder who presents with tremors.    Patient states that his last drink was this morning.  He reports attempting to stop drinking several times over the past few years.  He drinks nearly a 5th of hard liquor a day.  He is currently complaining of nausea.  Patient often gets tremors when he attempts to stop although he has never had a seizure.  He states he wants to stop since he is hurting his body.  He comes in for further evaluation and care.    In the ED, triage vitals were unremarkable.  CBC showed no leukocytosis.  CMP was significant for hypernatremia, anion gap of 20.  Alk-phos was elevated as well as AST and ALT.    Medicine was consulted for patient's alcohol withdrawals.      Overview/Hospital Course:  No notes on file    Interval History:  Feeling a bit better today.  No longer having hallucinations or tremors.  Last p.r.n. Ativan dosage was yesterday afternoon.  Anticipate that if patient remains stable can likely discharge tomorrow.    Review of Systems   Psychiatric/Behavioral:  Negative for agitation, confusion and hallucinations.      Objective:     Vital Signs (Most Recent):  Temp: 98.1 °F (36.7 °C) (08/15/23 1121)  Pulse: 67 (08/15/23 1121)  Resp: 18 (08/15/23 1121)  BP: (!) 151/98 (08/15/23 1121)  SpO2: 99 % (08/15/23 1121) Vital Signs (24h Range):  Temp:  [97 °F (36.1 °C)-98.1 °F (36.7 °C)] 98.1 °F (36.7 °C)  Pulse:  [60-76] 67  Resp:  [17-20] 18  SpO2:  [96 %-100 %] 99 %  BP: (135-158)/(84-99) 151/98     Weight: 93.9 kg (207 lb 0.2 oz)  Body mass index is 29.7 kg/m².    Intake/Output Summary (Last 24  hours) at 8/15/2023 1155  Last data filed at 8/15/2023 0544  Gross per 24 hour   Intake 600.18 ml   Output --   Net 600.18 ml         Physical Exam  Vitals reviewed.   HENT:      Head: Normocephalic.      Right Ear: There is no impacted cerumen.      Left Ear: There is no impacted cerumen.      Nose: No congestion or rhinorrhea.      Mouth/Throat:      Pharynx: No oropharyngeal exudate or posterior oropharyngeal erythema.   Eyes:      General:         Right eye: No discharge.         Left eye: No discharge.   Cardiovascular:      Rate and Rhythm: Normal rate.      Heart sounds: No murmur heard.  Pulmonary:      Effort: No respiratory distress.      Breath sounds: No wheezing.   Abdominal:      General: There is no distension.      Tenderness: There is no abdominal tenderness.   Musculoskeletal:         General: No deformity.      Cervical back: No rigidity.      Right lower leg: No edema.   Lymphadenopathy:      Cervical: No cervical adenopathy.   Skin:     Findings: No bruising or lesion.   Neurological:      Mental Status: He is alert.      Cranial Nerves: No cranial nerve deficit.      Motor: No weakness.   Psychiatric:         Mood and Affect: Mood normal.             Significant Labs: All pertinent labs within the past 24 hours have been reviewed.    Significant Imaging: I have reviewed all pertinent imaging results/findings within the past 24 hours.      Assessment/Plan:      * Alcohol withdrawal  Patient reports several episodes of tremors during alcohol withdrawals  Denies any past seizures  Truly wishes to stop    Plan:  Start CIWA protocol with scheduled Valium and IV Ativan pushes for CIWA greater than 8  Daily multivitamin and thiamine to prevent Wernicke's encephalopathy      Elevated liver enzymes  -likely secondary to alcohol use    Plan:  Daily CMP   Liver ultrasound  -HIV/hepatitis panel        Alcohol use disorder  Patient with alcohol use disorder since college.  Reports several bingeing episodes.   Patient wishes to stop    Plan:  Consider Psychology outpatient referral  Consider trying naltrexone inpatient        VTE Risk Mitigation (From admission, onward)         Ordered     heparin (porcine) injection 5,000 Units  Every 8 hours         08/13/23 2034     IP VTE HIGH RISK PATIENT  Once         08/13/23 2034     Place sequential compression device  Until discontinued         08/13/23 2034                Discharge Planning   SABRINA:      Code Status: Full Code   Is the patient medically ready for discharge?:     Reason for patient still in hospital (select all that apply): Patient trending condition and Treatment                     Joseph Travis MD  Department of Hospital Medicine   [facility]

## 2023-08-15 NOTE — ASSESSMENT & PLAN NOTE
-likely secondary to alcohol use    Plan:  Daily CMP   Liver ultrasound  -HIV/hepatitis panel       Medication:    Outpatient Medications Marked as Taking for the 11/28/21 encounter (Refill) with Laura Yahr Nelson, MD   Medication Sig Dispense Refill   • lamoTRIgine (LaMICtal) 100 MG tablet TAKE 1 TABLET BY MOUTH DAILY 30 tablet 1   • DULoxetine (CYMBALTA) 60 MG capsule Take 1 capsule by mouth daily. 30 capsule 1   • guanFACINE (INTUNIV) 2 MG TABLET SR 24 HR Take 1 tablet by mouth daily. 30 tablet 2       Message to Prescriber: Patient is out of duloxetine, received 2 day emergency supply from her pharmacy and is now out    [x] Pharmacy has been verified.    [x] Patient completely out of medication (*Route encounter as high priority if checked)    [x] Patient informed refill request can take up to 5 business days to be processed    Patient currently has follow up appointment scheduled

## 2023-08-15 NOTE — MEDICAL/APP STUDENT
"PSYCHIATRY INPATIENT CONSULT NOTE      8/15/2023 10:28 AM   Valencia Solorzano   1990   [id number]           DATE OF ADMISSION: 8/13/2023  6:43 PM    SITE: [facility]    CURRENT LEGAL STATUS: Patient does not currently meet PEC/CEC criteria due to not currently being an imminent threat to self/others and not being gravely disabled 2/2 mental illness at this time.       HISTORY    Chief Complaint / Reason for Psychiatry Consult: Alcohol use disorder      HPI   Valencia Solorzano is a 32 y.o. male with a significant past history of alcohol use disorder, currently being treated by their inpatient primary treatment team for a principal problem of tremor.  Psychiatry was originally consulted as noted above.  The patient was seen and examined.  The chart was reviewed.  On examination today, the patient was alert and oriented. He endorsed a "very bad drinking problem" that he states has been ongoing since college years ago. He also smokes heavily, about 1 pack a day but denies marijuana or other substance use. His alcohol use reduces his appetite, causes abdominal pain, diarrhea, and occasional emesis. He has good energy levels when sober but drinking lowers his energy. He has trouble sleeping sober, sometimes staying up for 2-3 nights in a row; he uses the alcohol to help him sleep. He gets tremors often from drinking but denies seizure or DT.     He has had trouble holding jobs because of his drinking. 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 He reports using $450 this past week on alcohol alone. He drinks about one fifth of hard liquor a day, sometimes more. His drinking typically follows a pattern of 1 week of binge drinking followed by 3-4 days of "recovery" where he drinks less. He gets strong cravings for alcohol and says that seeing or even smelling it makes him want to drink, though he sometimes drinks to "get his mind off things".    He has " "tried quitting several times on his own over the past few years but failed. He also trialed an alcohol detox program once, in [place] 2 years ago, but says that it was strict and "felt like group home". He did not enjoy it, remaining sober for only 1 month (which was his longest period of abstinence) and then relapsing. He also feels that he failed the program because he did not take it seriously enough. His drinking has had a big mental toll on him, reporting that he feels he has "failed overall" and is "not happy with himself". He has had a low mood on an off for years due to this but feels hopeful for the future and wants to start a family once he is stable. He is motivated to quit including stating his willingness to go to rehab. He feels that he has a good support system in his family, and an agreeable boss who told him to do whatever he needs to, and then return to work. He denies any violence or legal trouble related to alcohol use, stating that alcohol makes him sleepy rather than agitated.     Mr. Solorzano reports a good upbringing. He enjoyed sports and made good grades at school, later graduating college with a major in exercise physiology. He lives with his parents who he reports a good relationship with, but has little to no relationships with past friends due to alcohol use. He likes to write poetry and read. He occasionally plays sports but reports it's been harder due to his drinking and lack of interest recently. He used to play football in college but denies any major head trauma.    He denies any current/recent passive/active SI/HI or access to guns.  He denies any other medical complaints at this time. Moderate tremor was observed during the examination.  After discussing the risks, benefits, alt vs no treatment, he is agreeable to  a trial of alcohol rehabilitation in an effort to improve his alcohol use disorder troubles.     CIWA-Ar total score: 5 (for moderate tremor)  -Nausea/vomiting: " no  -Tremor: 5  -Anxiety: no  -Tactile disturbances: no  -Auditory disturbances: no  -Visual disturbances: no  -Headache: no  -Orientation: fully oriented    Collateral:  N/A      Psychiatric Review Of Systems - Currently, the patient is endorsing and/or denying the following:    Symptoms of Depression:   Endorses diminished mood or loss of interest/anhedonia; change in appetite, change in sleep, guilt   Denies irritability, diminished energy, change in sleep, diminished concentration or cognition or indecisiveness, PMA/R, hopelessness or worthlessness, suicidal ideations    Sleep: Endorses trouble with initiation, maintenance    Suicidal/Homicidal ideations: Denies active/passive ideations, organized plans, future intentions    Symptoms of psychosis: Denies hallucinations, delusions, disorganized thinking, disorganized behavior or abnormal motor behavior, or negative symptoms (diminshed emotional expression, avolition, anhedonia, alogia, asociality     Symptoms of chika or hypomania: Denies elevated, expansive, or irritable mood with increased energy or activity; with inflated self-esteem or grandiosity, decreased need for sleep, increased rate of speech, FOI or racing thoughts, distractibility, increased goal directed activity or PMA, risky/disinhibited behavior    Symptoms of DAYAN: Denies excessive anxiety/worry/fear, more days than not, about numerous issues, difficult to control, with restlessness, fatigue, poor concentration, irritability, muscle tension, sleep disturbance; causes functionally impairing distress     Symptoms of Panic Disorder: Denies recurrent panic attacks, precipitated or un-precipitated, source of worry and/or behavioral changes secondary; with or without agoraphobia    Symptoms of PTSD: Denies h/o trauma; re-experiencing/intrusive symptoms, avoidant behavior, negative alterations in cognition or mood, or hyperarousal symptoms; with or without dissociative symptoms     Symptoms of OCD:  Denies obsessions or compulsions     Symptoms of Eating Disorders: Denies anorexia, bulimia or binging    Substance Use:   Endorses intoxication, tolerance, used in larger amounts or duration than intended, unsuccessful attempts to limit or quit, increased time engaging in or seeking out, cravings or strong desire to use, failure to fulfill obligations, negative consequences in social/interpersonal/occupational,/recreational areas, use in dangerous situations, medical or psychological consequences       Psychiatric Review Of Systems - Is patient experiencing or having changes in:  sleep: yes, difficult to sleep when sober, sometimes stays up 2-3 days without sleep  appetite: yes, very low appetite when drunk  weight: no  energy/anergy: yes, low energy when drunk but normal when sober  interest/pleasure/anhedonia: yes  somatic symptoms: no  libido: no  guilty/hopelessness: no  concentration: no  S.I.B.s/risky behavior: no  SI/SA:  no    anxiety/panic: no  Agoraphobia:  no  Social phobia:  no  Recurrent nightmares:  no  hyper startle response:  no  Avoidance: no  Recurrent thoughts:  no  Recurrent behaviors:  no    Irritability: no  Racing thoughts: no  Impulsive behaviors: no  Pressured speech:  no    Paranoia:no  Delusions: no  AVH:no      PSYCHOTHERAPY ADD-ON +23516   30 (16-37*) minutes    Time: *** minutes  Participants: Met with patient    Therapeutic Intervention Type: behavior modifying psychotherapy, supportive psychotherapy  Why chosen therapy is appropriate versus another modality: relevant to diagnosis, patient responds to this modality, evidence based practice    Target symptoms: {PSY TARGET SYMPTOMS:41650}  Primary focus: ***  Psychotherapeutic techniques: supportive and psychodynamic techniques; psycho-education; deep breathing exercises; CBT; problem solving techniques and managing life stressors    Outcome monitoring methods: self-report, observation    Patient's response to intervention:  The  patient's response to intervention is {response:27834}.    Progress toward goals:  The patient's progress toward goals is {progress:52119}.      ROS  General ROS: negative for - chills, fatigue, fever or night sweats  Ophthalmic ROS: negative for - blurry vision, double vision or eye pain  ENT ROS: negative for - sinus pain, headaches, sore throat or visual changes  Allergy and Immunology ROS: negative for - hives, itchy/watery eyes or nasal congestion  Hematological and Lymphatic ROS: negative for - bleeding problems, bruising, jaundice or pallor  Endocrine ROS: negative for - galactorrhea, hot flashes, mood swings, palpitations or temperature intolerance  Respiratory ROS: negative for - cough, hemoptysis, shortness of breath, tachypnea or wheezing  Cardiovascular ROS: negative for - chest pain, dyspnea on exertion, loss of consciousness, palpitations, rapid heart rate or shortness of breath  Gastrointestinal ROS: negative for - appetite loss, nausea, abdominal pain, blood in stools, change in bowel habits, constipation or diarrhea  Genito-Urinary ROS: negative for - incontinence, nocturia or pelvic pain  Musculoskeletal ROS: negative for - joint stiffness, joint swelling, joint pain or muscle pain   Neurological ROS: negative for - behavioral changes, confusion, dizziness, memory loss, numbness/tingling or seizures  Dermatological ROS: negative for dry skin, hair changes, pruritus or rash  Psychiatric ROS: see detailed psychiatric ROS above in history section       Past Psychiatric History:  Previous Medication Trials: no   Previous Psychiatric Hospitalizations: no   Previous Suicide Attempts: no   History of Violence: no  Outpatient Psychiatrist: no    Social History:  Marital Status: not   Children: 0   Employment Status/Info: currently employed, having trouble holding jobs  Education: college graduate, exercise physiology major  Special Ed: no  : no  Worship:  Congregational  Housing Status:  lives  with parents and 2 brothers  Hobbies/Leisure time: no  History of phys/sexual abuse: no  Access to gun: no    Family Psychiatric History: no    Substance Abuse History:  Recreational Drugs:  none  Use of Alcohol: heavy, a fifth of hard liquor a day or more  Rehab History:yes, tried alcohol detox in 2021 but relapsed afterwards   Tobacco Use:yes, 1 pack a day  Use of Caffeine:  rarely  Use of OTC: no  Legal consequences of chemical use: no    Legal History:  Past Charges/Incarcerations:no, ***   Pending charges:no     Psychosocial Stressors: drug and alcohol.   Functioning Relationships: good support system, and good relationship with parents, but isolated from friends  Strengths AND Liabilities  Strength: Patient is expressive/articulate., Strength: Patient is intelligent., Strength: Patient is motivated for change., Strength: Patient has positive support network., Strength: Patient has reasonable judgment., Liability: Patient has poor health.      PAST MEDICAL & SURGICAL HISTORY   No past medical history on file.  No past surgical history on file.    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    ALLERGIES   Review of patient's allergies indicates:  No Known Allergies    CURRENT MEDICATION REGIMEN   Home Meds:   Prior to Admission medications    Not on File       OTC Meds: none    Scheduled Meds:    diazePAM  10 mg Oral Q8H    Followed by    [START ON 8/17/2023] diazePAM  5 mg Oral Q8H    Followed by    [START ON 8/20/2023] diazePAM  2 mg Oral Q8H    folic acid  1 mg Oral Daily    heparin (porcine)  5,000 Units Subcutaneous Q8H    multivitamin  1 tablet Oral Daily    nicotine  1 patch Transdermal Daily    polyethylene glycol  17 g Oral Daily    thiamine (B-1) 250 mg in dextrose 5 % (D5W) 100 mL IVPB  250 mg Intravenous Daily    Followed by    [START ON 8/17/2023] thiamine  100 mg Oral TID      PRN Meds:  acetaminophen, glucagon (human recombinant), glucose, glucose, lorazepam, LORazepam, melatonin, naloxone, ondansetron, simethicone, sodium chloride 0.9%   Psychotherapeutics (From admission, onward)      Start     Stop Route Frequency Ordered    08/20/23 1400  diazePAM tablet 2 mg        See Hyperspace for full Linked Orders Report.    08/23/23 1359 Oral Every 8 hours 08/14/23 1412 08/17/23 1400  diazePAM tablet 5 mg        See Hyperspace for full Linked Orders Report.    08/20/23 1359 Oral Every 8 hours 08/14/23 1412 08/14/23 1415  diazePAM tablet 10 mg        See Hyperspace for full Linked Orders Report.    08/17/23 1359 Oral Every 8 hours 08/14/23 1412 08/13/23 2135  LORazepam tablet 2 mg         -- Oral Every 4 hours PRN 08/13/23 2036 08/13/23 2135  LORazepam injection 2 mg         -- IV Every 2 hours PRN 08/13/23 2036            LABORATORY DATA   Recent Results (from the past 72 hour(s))   CBC auto differential    Collection Time: 08/13/23  7:00 PM   Result Value Ref Range    WBC 8.71 3.90 - 12.70 K/uL    RBC 4.59 (L) 4.60 - 6.20 M/uL    Hemoglobin 14.4 14.0 - 18.0 g/dL    Hematocrit 43.3 40.0 - 54.0 %    MCV 94 82 - 98 fL    MCH 31.4 (H) 27.0 - 31.0 pg    MCHC 33.3 32.0 - 36.0 g/dL    RDW 13.9 11.5 - 14.5 %    Platelets 274 150 - 450 K/uL    MPV 9.9 9.2 - 12.9 fL    Immature Granulocytes 0.1 0.0 - 0.5 %    Gran # (ANC) 4.5 1.8 - 7.7 K/uL    Immature Grans (Abs) 0.01 0.00 - 0.04 K/uL    Lymph # 3.0 1.0 - 4.8 K/uL    Mono # 1.1 (H) 0.3 - 1.0 K/uL    Eos # 0.1 0.0 - 0.5 K/uL    Baso # 0.13 0.00 - 0.20 K/uL    nRBC 0 0 /100 WBC    Gran % 51.2 38.0 - 73.0 %    Lymph % 34.1 18.0 - 48.0 %    Mono % 12.5 4.0 - 15.0 %    Eosinophil % 0.6 0.0 - 8.0 %    Basophil % 1.5 0.0 - 1.9 %    Differential Method Automated    Comprehensive metabolic panel    Collection Time: 08/13/23  7:00 PM   Result Value Ref Range    Sodium 146 (H) 136 - 145 mmol/L    Potassium 4.1 3.5 - 5.1 mmol/L    Chloride 108 95 - 110 mmol/L     CO2 18 (L) 23 - 29 mmol/L    Glucose 105 70 - 110 mg/dL    BUN 13 6 - 20 mg/dL    Creatinine 1.2 0.5 - 1.4 mg/dL    Calcium 8.6 (L) 8.7 - 10.5 mg/dL    Total Protein 7.4 6.0 - 8.4 g/dL    Albumin 4.3 3.5 - 5.2 g/dL    Total Bilirubin 0.9 0.1 - 1.0 mg/dL    Alkaline Phosphatase 48 (L) 55 - 135 U/L    AST 75 (H) 10 - 40 U/L    ALT 54 (H) 10 - 44 U/L    eGFR >60 >60 mL/min/1.73 m^2    Anion Gap 20 (H) 8 - 16 mmol/L   Magnesium    Collection Time: 08/13/23  7:00 PM   Result Value Ref Range    Magnesium 2.1 1.6 - 2.6 mg/dL   Ammonia    Collection Time: 08/13/23  7:00 PM   Result Value Ref Range    Ammonia 45 10 - 50 umol/L   POCT COVID-19 Rapid Screening    Collection Time: 08/13/23 11:17 PM   Result Value Ref Range    POC Rapid COVID Negative Negative     Acceptable Yes    Comprehensive Metabolic Panel (CMP)    Collection Time: 08/14/23  5:40 AM   Result Value Ref Range    Sodium 144 136 - 145 mmol/L    Potassium 3.6 3.5 - 5.1 mmol/L    Chloride 106 95 - 110 mmol/L    CO2 23 23 - 29 mmol/L    Glucose 83 70 - 110 mg/dL    BUN 11 6 - 20 mg/dL    Creatinine 1.1 0.5 - 1.4 mg/dL    Calcium 9.0 8.7 - 10.5 mg/dL    Total Protein 6.9 6.0 - 8.4 g/dL    Albumin 4.0 3.5 - 5.2 g/dL    Total Bilirubin 1.3 (H) 0.1 - 1.0 mg/dL    Alkaline Phosphatase 55 55 - 135 U/L     (H) 10 - 40 U/L    ALT 72 (H) 10 - 44 U/L    eGFR >60 >60 mL/min/1.73 m^2    Anion Gap 15 8 - 16 mmol/L   CBC with Automated Differential    Collection Time: 08/14/23  5:40 AM   Result Value Ref Range    WBC 8.92 3.90 - 12.70 K/uL    RBC 4.49 (L) 4.60 - 6.20 M/uL    Hemoglobin 14.0 14.0 - 18.0 g/dL    Hematocrit 41.8 40.0 - 54.0 %    MCV 93 82 - 98 fL    MCH 31.2 (H) 27.0 - 31.0 pg    MCHC 33.5 32.0 - 36.0 g/dL    RDW 13.6 11.5 - 14.5 %    Platelets 273 150 - 450 K/uL    MPV 10.0 9.2 - 12.9 fL    Immature Granulocytes 0.1 0.0 - 0.5 %    Gran # (ANC) 4.6 1.8 - 7.7 K/uL    Immature Grans (Abs) 0.01 0.00 - 0.04 K/uL    Lymph # 3.0 1.0 - 4.8 K/uL     "Mono # 1.0 0.3 - 1.0 K/uL    Eos # 0.2 0.0 - 0.5 K/uL    Baso # 0.11 0.00 - 0.20 K/uL    nRBC 0 0 /100 WBC    Gran % 51.4 38.0 - 73.0 %    Lymph % 33.6 18.0 - 48.0 %    Mono % 11.2 4.0 - 15.0 %    Eosinophil % 2.5 0.0 - 8.0 %    Basophil % 1.2 0.0 - 1.9 %    Differential Method Automated    Comprehensive Metabolic Panel (CMP)    Collection Time: 08/15/23  3:34 AM   Result Value Ref Range    Sodium 138 136 - 145 mmol/L    Potassium 3.7 3.5 - 5.1 mmol/L    Chloride 105 95 - 110 mmol/L    CO2 24 23 - 29 mmol/L    Glucose 104 70 - 110 mg/dL    BUN 9 6 - 20 mg/dL    Creatinine 1.1 0.5 - 1.4 mg/dL    Calcium 9.0 8.7 - 10.5 mg/dL    Total Protein 6.5 6.0 - 8.4 g/dL    Albumin 3.7 3.5 - 5.2 g/dL    Total Bilirubin 0.9 0.1 - 1.0 mg/dL    Alkaline Phosphatase 50 (L) 55 - 135 U/L    AST 90 (H) 10 - 40 U/L    ALT 76 (H) 10 - 44 U/L    eGFR >60 >60 mL/min/1.73 m^2    Anion Gap 9 8 - 16 mmol/L   CBC with Automated Differential    Collection Time: 08/15/23  3:34 AM   Result Value Ref Range    WBC 7.84 3.90 - 12.70 K/uL    RBC 4.21 (L) 4.60 - 6.20 M/uL    Hemoglobin 13.2 (L) 14.0 - 18.0 g/dL    Hematocrit 38.9 (L) 40.0 - 54.0 %    MCV 92 82 - 98 fL    MCH 31.4 (H) 27.0 - 31.0 pg    MCHC 33.9 32.0 - 36.0 g/dL    RDW 12.8 11.5 - 14.5 %    Platelets 236 150 - 450 K/uL    MPV 10.4 9.2 - 12.9 fL    Immature Granulocytes 0.3 0.0 - 0.5 %    Gran # (ANC) 4.0 1.8 - 7.7 K/uL    Immature Grans (Abs) 0.02 0.00 - 0.04 K/uL    Lymph # 2.8 1.0 - 4.8 K/uL    Mono # 0.7 0.3 - 1.0 K/uL    Eos # 0.2 0.0 - 0.5 K/uL    Baso # 0.06 0.00 - 0.20 K/uL    nRBC 0 0 /100 WBC    Gran % 51.3 38.0 - 73.0 %    Lymph % 35.8 18.0 - 48.0 %    Mono % 9.2 4.0 - 15.0 %    Eosinophil % 2.6 0.0 - 8.0 %    Basophil % 0.8 0.0 - 1.9 %    Differential Method Automated       No results found for: "PHENYTOIN", "PHENOBARB", "VALPROATE", "CBMZ"      EXAMINATION    VITALS   Vitals:    08/15/23 0500 08/15/23 0514 08/15/23 0614 08/15/23 0754   BP: (!) 135/97 (!) 135/97  (!) 149/97 "   BP Location: Left arm Left arm     Patient Position: Lying Lying  Lying   Pulse: 76 76 69 68   Resp: 18 18  18   Temp: 97 °F (36.1 °C) 97 °F (36.1 °C)  97.4 °F (36.3 °C)   TempSrc: Oral Oral  Axillary   SpO2: 97% 97%  96%   Weight:       Height:            CONSTITUTIONAL  General Appearance: NAD, unremarkable, age appropriate, unremarkable, age appropriate, normal weight, well nourished    MUSCULOSKELETAL  Muscle Strength and Tone: WNL    Abnormal Involuntary Movements: none observed, positive for tremor  Gait and Station: WNL; non-ataxic     PSYCHIATRIC   Behavior/Cooperation:  normal, friendly and cooperative  Speech:  normal tone, normal rate, normal pitch, normal volume  Language: grossly intact with spontaneous speech  Mood: euthymic  Affect:  congruent with mood and appropriate to situation/content Normal  Associations:  intact; no BUSTER  Thought Process: normal and logical  Thought Content: normal, no suicidality, no homicidality, delusions, or paranoia  Sensorium:  Awake  Alert and Oriented: to grossly intact  Memory: 3/3 immediate, 3/3 at 5 minutes    Recent:  Intact; able to report recent events   Remote:  intact; Named 4/4 past presidents   Attention/concentration: appropriate for age/education. Able to spell [id number] & [id number]   Similarities:  Intact; (difference between apple and orange?)  Abstract reasoning:  Intact  Insight:  Intact  Judgment:  Intact    CAM ICU Delirium Assessment - POSITIVE / NEGATIVE      Is the patient aware of the biomedical complications associated with substance abuse and mental illness? yes    Does the patient have an Advance Directive for Mental Health treatment? no  (If yes, inform patient to bring copy.)      MEDICAL DECISION MAKING    ASSESSMENT      Alcohol use disorder     RECOMMENDATIONS       - patient does not currently meet PEC/CEC criteria due to not being an imminent threat to self/others and not being gravely disabled 2/2 mental illness.      - Pt's CIWA score  is 5, indicating mild withdrawal  -Continue Diazepam taper (10mg Q8H for 3 days then 5mg Q8H for 3 days, then 2mg Q8H for 3 days)  -PRN IV Lorazepam 2mg  -Thiamine 100mg TID, Folate 1mg QD, MVI QD    -Nicotine patch 21mg/24hr     -Naltrexone or alcamprosate for long term management    -Likely to benefit from residential alcohol rehabilitation facility and AA meetings      - Psychotherapy was performed with patient as noted above      - Please have CM/SW assist patient with outpatient mental health f/u for s/p discharge from this facility      - Patient was instructed to call 911 and return to the nearest ED if he/she begins feeling suicidal, homicidal, or gravely disabled      - Thank you for this consult ; will continue to follow patient         Time spent with patient and/or on unit managing/coordinating patient's care (excluding the time spent on psychotherapy): 70 minutes      More than 50% of the time was spent counseling/coordinating care      STAFF:  Josi Ngo, MS3  8/15/2023

## 2023-08-15 NOTE — PLAN OF CARE
LEELA met with pt at bedside to discuss dc planning. Pt confirmed information on chart. Pt is unsure of home address at this time. Pt is independent in ADLs. Pt resides in home with his mother. Pt has no hh/dme at home. Pt reports that he is agreeable to dc to [facility] upon dc. SW expressed understanding. Pt made aware to contact sw with any questions or concerns. SW will continue to follow pt throughout his transitions of care and assist with any dc needs.     LEELA faxed referral to [facility] [phone].     08/15/23 1307   Discharge Assessment   Assessment Type Discharge Planning Assessment   Confirmed/corrected address, phone number and insurance Yes   Confirmed Demographics Correct on Facesheet   Source of Information patient   Communicated SABRINA with patient/caregiver Yes   People in Home parent(s)   Do you expect to return to your current living situation? Yes   Do you have help at home or someone to help you manage your care at home? Yes   Who are your caregiver(s) and their phone number(s)? Nia Trevino (Mother)   [phone]   Prior to hospitilization cognitive status: Alert/Oriented   Current cognitive status: Alert/Oriented   Home Layout Able to live on 1st floor   Equipment Currently Used at Home none   Do you take prescription medications? No   Do you have prescription coverage? Yes   Coverage MEDICAID - HEALTHY BLUE ([facility])   Do you have any problems affording any of your prescribed medications? TBD   Who is going to help you get home at discharge? BelindaNia (Mother)   [phone]   How do you get to doctors appointments? family or friend will provide   Are you on dialysis? No   Do you take coumadin? No   DME Needed Upon Discharge  none   Discharge Plan discussed with: Patient   Transition of Care Barriers None   Discharge Plan A Home with family   Financial Resource Strain   How hard is it for you to pay for the very  basics like food, housing, medical care, and heating? Not hard   Housing Stability   In the last 12 months, was there a time when you were not able to pay the mortgage or rent on time? N   In the last 12 months, was there a time when you did not have a steady place to sleep or slept in a shelter (including now)? N   Transportation Needs   In the past 12 months, has lack of transportation kept you from medical appointments or from getting medications? no   In the past 12 months, has lack of transportation kept you from meetings, work, or from getting things needed for daily living? No   Food Insecurity   Within the past 12 months, you worried that your food would run out before you got the money to buy more. Never true   Within the past 12 months, the food you bought just didn't last and you didn't have money to get more. Never true   Alcohol Use   Q1: How often do you have a drink containing alcohol? 4 or more ti   Q3: How often do you have six or more drinks on one occasion? Daily   OTHER   Name(s) of People in Home Nia Trevino (Mother)   [phone]

## 2023-08-15 NOTE — CONSULTS
"PSYCHIATRY INPATIENT CONSULT NOTE      8/15/2023 10:00 AM   Valencia Solorzano   1990   [id number]           DATE OF ADMISSION: 8/13/2023  6:43 PM    SITE: [facility]    CURRENT LEGAL STATUS: Patient does not currently meet PEC criteria due to not currently being an imminent threat to self/others and not being gravely disabled 2/2 mental illness at this time.     HISTORY:    Per Initial History from Primary Team:  Patient presents with    Delirium Tremens (DTS)       To ed via [facility] from [facility] for eval of tremors and "the shakes". Pt reports daily etoh use. States last drink was this morning. Pt given 1L NS by EMS PTA.    Valencia Solorzano is a 32 yr male with alcohol use disorder who presents with tremors.  Patient states that his last drink was this morning.  He reports attempting to stop drinking several times over the past few years.  He drinks nearly a 5th of hard liquor a day.  He is currently complaining of nausea.  Patient often gets tremors when he attempts to stop although he has never had a seizure.  He states he wants to stop since he is hurting his body.  He comes in for further evaluation and care.  In the ED, triage vitals were unremarkable.  CBC showed no leukocytosis.  CMP was significant for hypernatremia, anion gap of 20.  Alk-phos was elevated as well as AST and ALT.  Medicine was consulted for patient's alcohol withdrawals.  Interval History from Primary Team on 08/15/2023:  Feeling a bit better today.  No longer having hallucinations or tremors.  Last p.r.n. Ativan dosage was yesterday afternoon.  Anticipate that if patient remains stable can likely discharge tomorrow.      Chief Complaint / Reason for Psychiatry Consult: Alcohol Abuse / Dependence / Withdrawal       HPI:   Valencia Solorzano is a 32 y.o. male with a past medical history of alcohol use disorder, otherwise reports no other medical history, currently being treated by his inpatient primary treatment team for a principal problem " "of alcohol withdrawal.  Psychiatry was originally consulted as noted above.  The patient was seen and examined.  The chart was reviewed.  On examination today, the patient was alert and oriented to person, place,city, state, month, year, and situation, with minimal tremor visible in his right hand, no tremor in left hand, and no asterix.  He denies any current or prior passive/active SI/HI.  He denies any current or prior AH, VH, TH, delusions, paranoia, or chika s/s.  He denies feeling anxious at this time and denies any historical s/s consistent with DAYAN.  He denies any adverse effects to his current medication regimen.  Regarding current medical/physical complaints, he endorses improving tremors and improving fatigue from yesterday. He denies any other medical complaints at this time.  NAD was observed during the examination.  After discussing the risks, benefits, alt vs no treatment, he is agreeable and desiring to go to [facility] for residential alcohol rehab.  The patient reports drinking since age 19 but began heavily drinking after college. He endorses patterns of binge drinking, often a week or week and a half of drinking followed by 3-4 days to recover, sometimes requiring him to take time off of work. His drinking varies but 2 pints of whiskey followed by a 40oz would be considered average. He smokes in concordance with the type of alcohol he drinks, a pack or more at a time is not unusual.  After drinking, he experiences intense vomiting, stomach cramps, and tremors. He knows not to eat due to the excessive vomiting. He understands to drink electrolytes to try to alleviate the cramps. He does not take vitamins. He does not believe he has experienced a seizure before, but endorses 3-4 days of sleep and recovery required for him to be functional again.  He reports high tolerance of alcohol, and describes the urge to drink "at the sight of alcohol". He has tried to quit on his own multiple times and " has been to rehab once in 2021 at [place] for 30 days after a 7-8 day detox, at the urge or his mother. The patient feels like he was not engaged in his rehab for the first half and only actively started participating later on. He reports relapsing on the way home. He lives with his mom and has family support. He reports hiding alcohol in order to drink at home. He is employed and his employer is supportive of him taking time off in order to recover, however, he has lost previous jobs due to need to take time off after drinking to recover. He feels he spends too much money on alcohol, including $450 in a week. However, his employment is good and he does not feel he has financial stress. He was previously a  for 8th grade and has held various jobs. He was also a football player and , with education in physiology and exercise, and tries to keep healthy.  Psychotherapy was implemented as noted below with a focus on improving alcohol & tobacco cessation / total sobriety, mood, and sleep.  See A/P below.           Psychiatric Review Of Systems - Currently, the patient is endorsing and/or denying the following:  (patient's endorsements are BOLDED below; if not BOLDED, then patient denied):    Endorses intermittent Symptoms of Depression: diminished mood, low motivation, loss of interest/anhedonia, irritability, diminished energy, change in sleep, change in appetite, diminished concentration or cognition or indecisiveness, PMA/R, excessive guilt or hopelessness or worthlessness, suicidal ideations    Endorses intermittent issues with Sleep: initiation, maintenance, early morning awakening with inability to return to sleep    Denies Suicidal/Homicidal ideations: active/passive ideations, organized plans, future intentions    Denies Symptoms of psychosis: hallucinations, delusions, disorganized thinking, disorganized behavior or abnormal motor behavior, or negative symptoms (diminshed emotional expression,  avolition, anhedonia, alogia, asociality)     Denies Symptoms of chika or hypomania: elevated, expansive, or irritable mood with increased energy or activity; with inflated self-esteem or grandiosity, decreased need for sleep, increased rate of speech, FOI or racing thoughts, distractibility, increased goal directed activity or PMA, risky/disinhibited behavior    Denies Symptoms of DAYAN: excessive anxiety/worry/fear, more days than not, about numerous issues, difficult to control, with restlessness, fatigue, poor concentration, irritability, muscle tension, sleep disturbance; causes functionally impairing distress     Denies Symptoms of Panic Disorder: recurrent panic attacks, precipitated or un-precipitated, source of worry and/or behavioral changes secondary; with or without agoraphobia    Denies Symptoms of PTSD: h/o trauma; re-experiencing/intrusive symptoms, avoidant behavior, negative alterations in cognition or mood, or hyperarousal symptoms; with or without dissociative symptoms     Denies Symptoms of OCD: obsessions or compulsions     Denies Symptoms of Eating Disorders: anorexia, bulimia or binging    Endorses Substance Use (alcohol): intoxication, withdrawal, tolerance, used in larger amounts or duration than intended, unsuccessful attempts to limit or quit, increased time engaging in or seeking out, cravings or strong desire to use, failure to fulfill obligations, negative consequences in social/interpersonal/occupational,/recreational areas, use in dangerous situations, medical or psychological consequences         Psychiatric Review Of Systems - Is patient experiencing or having changes in:  sleep: no, generally gets little sleep, attributes it to alcohol; when he drinks for a week, he will need to take 3-4 days off to sleep it off  appetite: yes, does not eat after alcohol because he knows he will vomit  weight: no  energy/anergy: yes  interest/pleasure/anhedonia: no  somatic symptoms: no  libido:  no  guilty/hopelessness: yes  concentration: yes, after drinking  S.I.B.s/risky behavior: no  SI/SA:  no  anxiety/panic: no  Agoraphobia:  no  Social phobia:  no  Recurrent nightmares:  no  hyper startle response:  no  Avoidance: no  Recurrent thoughts:  no  Recurrent behaviors:  yes, feels like he has an urge to drink at the sight of alcohol  Irritability: no, not currently, but is aware his behavior can change with alcohol  Racing thoughts: no  Impulsive behaviors: yes, drinking  Pressured speech:  no  Paranoia:no  Delusions: no  AVH:no      [facility] Toolkit ASQ Suicide Screening Tool:  In the past few weeks, have you wished you were dead? Denies   In the past few weeks, have you felt that you or your family would be better off if you were dead? Denies  In the past week, have you been having thoughts about killing yourself? Denies  Have you ever tried to kill yourself? Denies  Are you having thoughts of killing yourself right now? Denies       PSYCHOTHERAPY ADD-ON [id number]   30 (16-37*) minutes    Time: 20 minutes  Participants: Met with patient    Therapeutic Intervention Type: behavior modifying psychotherapy, supportive psychotherapy  Why chosen therapy is appropriate versus another modality: relevant to diagnosis, patient responds to this modality, evidence based practice    Target symptoms: alcohol & tobacco abuse / dependence, mood, and insomnia  Primary focus: improving alcohol & tobacco cessation / total sobriety, mood, and sleep  Psychotherapeutic techniques: supportive and psychodynamic techniques; psycho-education; deep breathing exercises; motivational interviewing; CBT; problem solving techniques and managing life / medical stressors    Outcome monitoring methods: self-report, observation    Patient's response to intervention:  The patient's response to intervention is accepting.    Progress toward goals:  The patient's progress toward goals is fair.      ROS:  General ROS: negative for - chills, fever or  night sweats; positive for improving fatigue  Ophthalmic ROS: negative for - blurry vision, double vision or eye pain  ENT ROS: negative for - sinus pain, headaches, sore throat or visual changes  Allergy and Immunology ROS: negative for - hives, itchy/watery eyes or nasal congestion  Hematological and Lymphatic ROS: negative for - bleeding problems, bruising, jaundice or pallor  Endocrine ROS: negative for - galactorrhea, hot flashes, mood swings, palpitations or temperature intolerance  Respiratory ROS: negative for - cough, hemoptysis, shortness of breath, tachypnea or wheezing  Cardiovascular ROS: negative for - chest pain, dyspnea on exertion, loss of consciousness, palpitations, rapid heart rate or shortness of breath  Gastrointestinal ROS: negative for - appetite loss, nausea, abdominal pain, blood in stools, change in bowel habits, constipation or diarrhea  Genito-Urinary ROS: negative for - incontinence, nocturia or pelvic pain  Musculoskeletal ROS: negative for - joint stiffness, joint swelling, joint pain or muscle pain   Neurological ROS: negative for - behavioral changes, confusion, dizziness, memory loss, numbness/tingling or seizures  Dermatological ROS: negative for dry skin, hair changes, pruritus or rash  Psychiatric ROS: see detailed psychiatric ROS above in history section       Past Psychiatric History:  Previous Medication Trials: denies  Previous Psychiatric Hospitalizations: denies  Previous Suicide Attempts: denies  History of Violence: denies  Outpatient Psychiatrist: denies  Hx of Diagnoses: denies      Social History:  Marital Status: not   Children: 0   Employment Status/Info: currently employed at a [employer]   Education: college graduate  Special Ed: denies  : denies  Sabianist:  Uatsdin  Housing Status: Lives with mom  Hobbies/Leisure time: yes, time with mom   History of phys/sexual abuse: denies  Access to gun: denies     Family Psychiatric History: denies       Substance Abuse History:  Recreational Drugs: denies   Use of Alcohol: heavy (see HPI) ; denies hx of complicated withdrawal   Rehab History:yes once in 2021 at [place] for 30 days; relapse on the way home  Tobacco Use: yes, smokes when he drinks  Use of Caffeine:  occasionally mixes coca cola with alcohol  Legal consequences of chemical use: denies     Legal History:  Past Charges/Incarcerations: denies  Pending charges: denies      Psychosocial Stressors: patient has insight that drinking affects his relationships with family, keeps him from being able to go to work; has lost previous jobs due to need to sleep in after drinking.   Functioning Relationships: good support system and good relationship with parents.  Strengths AND Liabilities: Strength: Patient accepts guidance/feedback, Strength: Patient is expressive/articulate., Strength: Patient is motivated for change., Strength: Patient has positive support network., Strength: Patient has reasonable judgment., Strength: Patient is stable.      PAST MEDICAL & SURGICAL HISTORY   No past medical history on file.  No past surgical history on file.    NEUROLOGIC HISTORY  Seizures: denies   Head trauma with LOC: denies  CVA: denies     FAMILY HISTORY   No family history on file.    ALLERGIES   Review of patient's allergies indicates:  No Known Allergies    CURRENT MEDICATION REGIMEN   Home Meds:   Prior to Admission medications    Not on File       OTC Meds: denies     Scheduled Meds:    diazePAM  10 mg Oral Q8H    Followed by    [START ON 8/17/2023] diazePAM  5 mg Oral Q8H    Followed by    [START ON 8/20/2023] diazePAM  2 mg Oral Q8H    folic acid  1 mg Oral Daily    heparin (porcine)  5,000 Units Subcutaneous Q8H    multivitamin  1 tablet Oral Daily    nicotine  1 patch Transdermal Daily    polyethylene glycol  17 g Oral Daily    thiamine (B-1) 250 mg in dextrose 5 % (D5W) 100 mL IVPB  250 mg Intravenous Daily    Followed by    [START ON 8/17/2023] thiamine  100  mg Oral TID      PRN Meds: acetaminophen, glucagon (human recombinant), glucose, glucose, lorazepam, LORazepam, melatonin, naloxone, ondansetron, simethicone, sodium chloride 0.9%   Psychotherapeutics (From admission, onward)      Start     Stop Route Frequency Ordered    08/20/23 1400  diazePAM tablet 2 mg        See Hyperspace for full Linked Orders Report.    08/23/23 1359 Oral Every 8 hours 08/14/23 1412 08/17/23 1400  diazePAM tablet 5 mg        See Hyperspace for full Linked Orders Report.    08/20/23 1359 Oral Every 8 hours 08/14/23 1412    08/14/23 1415  diazePAM tablet 10 mg        See Hyperspace for full Linked Orders Report.    08/17/23 1359 Oral Every 8 hours 08/14/23 1412 08/13/23 2135  LORazepam tablet 2 mg         -- Oral Every 4 hours PRN 08/13/23 2036 08/13/23 2135  LORazepam injection 2 mg         -- IV Every 2 hours PRN 08/13/23 2036            LABORATORY DATA   Recent Results (from the past 72 hour(s))   CBC auto differential    Collection Time: 08/13/23  7:00 PM   Result Value Ref Range    WBC 8.71 3.90 - 12.70 K/uL    RBC 4.59 (L) 4.60 - 6.20 M/uL    Hemoglobin 14.4 14.0 - 18.0 g/dL    Hematocrit 43.3 40.0 - 54.0 %    MCV 94 82 - 98 fL    MCH 31.4 (H) 27.0 - 31.0 pg    MCHC 33.3 32.0 - 36.0 g/dL    RDW 13.9 11.5 - 14.5 %    Platelets 274 150 - 450 K/uL    MPV 9.9 9.2 - 12.9 fL    Immature Granulocytes 0.1 0.0 - 0.5 %    Gran # (ANC) 4.5 1.8 - 7.7 K/uL    Immature Grans (Abs) 0.01 0.00 - 0.04 K/uL    Lymph # 3.0 1.0 - 4.8 K/uL    Mono # 1.1 (H) 0.3 - 1.0 K/uL    Eos # 0.1 0.0 - 0.5 K/uL    Baso # 0.13 0.00 - 0.20 K/uL    nRBC 0 0 /100 WBC    Gran % 51.2 38.0 - 73.0 %    Lymph % 34.1 18.0 - 48.0 %    Mono % 12.5 4.0 - 15.0 %    Eosinophil % 0.6 0.0 - 8.0 %    Basophil % 1.5 0.0 - 1.9 %    Differential Method Automated    Comprehensive metabolic panel    Collection Time: 08/13/23  7:00 PM   Result Value Ref Range    Sodium 146 (H) 136 - 145 mmol/L    Potassium 4.1 3.5 - 5.1 mmol/L     Chloride 108 95 - 110 mmol/L    CO2 18 (L) 23 - 29 mmol/L    Glucose 105 70 - 110 mg/dL    BUN 13 6 - 20 mg/dL    Creatinine 1.2 0.5 - 1.4 mg/dL    Calcium 8.6 (L) 8.7 - 10.5 mg/dL    Total Protein 7.4 6.0 - 8.4 g/dL    Albumin 4.3 3.5 - 5.2 g/dL    Total Bilirubin 0.9 0.1 - 1.0 mg/dL    Alkaline Phosphatase 48 (L) 55 - 135 U/L    AST 75 (H) 10 - 40 U/L    ALT 54 (H) 10 - 44 U/L    eGFR >60 >60 mL/min/1.73 m^2    Anion Gap 20 (H) 8 - 16 mmol/L   Magnesium    Collection Time: 08/13/23  7:00 PM   Result Value Ref Range    Magnesium 2.1 1.6 - 2.6 mg/dL   Ammonia    Collection Time: 08/13/23  7:00 PM   Result Value Ref Range    Ammonia 45 10 - 50 umol/L   POCT COVID-19 Rapid Screening    Collection Time: 08/13/23 11:17 PM   Result Value Ref Range    POC Rapid COVID Negative Negative     Acceptable Yes    Comprehensive Metabolic Panel (CMP)    Collection Time: 08/14/23  5:40 AM   Result Value Ref Range    Sodium 144 136 - 145 mmol/L    Potassium 3.6 3.5 - 5.1 mmol/L    Chloride 106 95 - 110 mmol/L    CO2 23 23 - 29 mmol/L    Glucose 83 70 - 110 mg/dL    BUN 11 6 - 20 mg/dL    Creatinine 1.1 0.5 - 1.4 mg/dL    Calcium 9.0 8.7 - 10.5 mg/dL    Total Protein 6.9 6.0 - 8.4 g/dL    Albumin 4.0 3.5 - 5.2 g/dL    Total Bilirubin 1.3 (H) 0.1 - 1.0 mg/dL    Alkaline Phosphatase 55 55 - 135 U/L     (H) 10 - 40 U/L    ALT 72 (H) 10 - 44 U/L    eGFR >60 >60 mL/min/1.73 m^2    Anion Gap 15 8 - 16 mmol/L   CBC with Automated Differential    Collection Time: 08/14/23  5:40 AM   Result Value Ref Range    WBC 8.92 3.90 - 12.70 K/uL    RBC 4.49 (L) 4.60 - 6.20 M/uL    Hemoglobin 14.0 14.0 - 18.0 g/dL    Hematocrit 41.8 40.0 - 54.0 %    MCV 93 82 - 98 fL    MCH 31.2 (H) 27.0 - 31.0 pg    MCHC 33.5 32.0 - 36.0 g/dL    RDW 13.6 11.5 - 14.5 %    Platelets 273 150 - 450 K/uL    MPV 10.0 9.2 - 12.9 fL    Immature Granulocytes 0.1 0.0 - 0.5 %    Gran # (ANC) 4.6 1.8 - 7.7 K/uL    Immature Grans (Abs) 0.01 0.00 - 0.04 K/uL     "Lymph # 3.0 1.0 - 4.8 K/uL    Mono # 1.0 0.3 - 1.0 K/uL    Eos # 0.2 0.0 - 0.5 K/uL    Baso # 0.11 0.00 - 0.20 K/uL    nRBC 0 0 /100 WBC    Gran % 51.4 38.0 - 73.0 %    Lymph % 33.6 18.0 - 48.0 %    Mono % 11.2 4.0 - 15.0 %    Eosinophil % 2.5 0.0 - 8.0 %    Basophil % 1.2 0.0 - 1.9 %    Differential Method Automated    Comprehensive Metabolic Panel (CMP)    Collection Time: 08/15/23  3:34 AM   Result Value Ref Range    Sodium 138 136 - 145 mmol/L    Potassium 3.7 3.5 - 5.1 mmol/L    Chloride 105 95 - 110 mmol/L    CO2 24 23 - 29 mmol/L    Glucose 104 70 - 110 mg/dL    BUN 9 6 - 20 mg/dL    Creatinine 1.1 0.5 - 1.4 mg/dL    Calcium 9.0 8.7 - 10.5 mg/dL    Total Protein 6.5 6.0 - 8.4 g/dL    Albumin 3.7 3.5 - 5.2 g/dL    Total Bilirubin 0.9 0.1 - 1.0 mg/dL    Alkaline Phosphatase 50 (L) 55 - 135 U/L    AST 90 (H) 10 - 40 U/L    ALT 76 (H) 10 - 44 U/L    eGFR >60 >60 mL/min/1.73 m^2    Anion Gap 9 8 - 16 mmol/L   CBC with Automated Differential    Collection Time: 08/15/23  3:34 AM   Result Value Ref Range    WBC 7.84 3.90 - 12.70 K/uL    RBC 4.21 (L) 4.60 - 6.20 M/uL    Hemoglobin 13.2 (L) 14.0 - 18.0 g/dL    Hematocrit 38.9 (L) 40.0 - 54.0 %    MCV 92 82 - 98 fL    MCH 31.4 (H) 27.0 - 31.0 pg    MCHC 33.9 32.0 - 36.0 g/dL    RDW 12.8 11.5 - 14.5 %    Platelets 236 150 - 450 K/uL    MPV 10.4 9.2 - 12.9 fL    Immature Granulocytes 0.3 0.0 - 0.5 %    Gran # (ANC) 4.0 1.8 - 7.7 K/uL    Immature Grans (Abs) 0.02 0.00 - 0.04 K/uL    Lymph # 2.8 1.0 - 4.8 K/uL    Mono # 0.7 0.3 - 1.0 K/uL    Eos # 0.2 0.0 - 0.5 K/uL    Baso # 0.06 0.00 - 0.20 K/uL    nRBC 0 0 /100 WBC    Gran % 51.3 38.0 - 73.0 %    Lymph % 35.8 18.0 - 48.0 %    Mono % 9.2 4.0 - 15.0 %    Eosinophil % 2.6 0.0 - 8.0 %    Basophil % 0.8 0.0 - 1.9 %    Differential Method Automated       No results found for: "PHENYTOIN", "PHENOBARB", "VALPROATE", "CBMZ"      EXAMINATION    VITALS   Vitals:    08/15/23 0614 08/15/23 0754 08/15/23 1121 08/15/23 1241   BP:  (!) " "149/97 (!) 151/98    BP Location:       Patient Position:  Lying Lying    Pulse: 69 68 67 84   Resp:  18 18    Temp:  97.4 °F (36.3 °C) 98.1 °F (36.7 °C)    TempSrc:  Axillary Oral    SpO2:  96% 99%    Weight:       Height:            CONSTITUTIONAL  General Appearance: NAD, unremarkable, age appropriate, normal weight, lying in bed, calm    MUSCULOSKELETAL  Muscle Strength and Tone: WNL    Abnormal Involuntary Movements: none observed   Gait and Station: WNL; non-ataxic     PSYCHIATRIC   Behavior/Cooperation:  friendly and cooperative, eye contact normal, calm  Speech:  normal tone, normal rate, normal pitch, normal volume  Language: grossly intact, able to name, able to repeat with spontaneous speech  Mood:  "doing better"  Affect:  congruent with mood and full / reactive   Associations: intact; no BUSTER  Thought Process: Linear and Goal-Directed toward sobriety   Thought Content: denies SI, HI, AH, VH, TH, delusions, or paranoia (no RIS observed)  Sensorium:  Awake  Alert and Oriented: to person, place, city, state, month, year, and situation   Memory: 3/3 immediate, 3/3 at 5 minutes    Recent: Intact; able to report recent events   Remote: Intact; Named 4/4 past presidents   Attention/concentration: Intact. Appropriate for age/education. Able to spell [id number] & [id number].   Similarities: Intact (difference between apple and orange?)  Abstract reasoning: Intact  Fund of Knowledge: Named 4/4 past presidents   Insight: Intact  Judgment: Intact    CAM ICU Delirium Assessment - NEGATIVE    Is the patient aware of the biomedical complications associated with substance abuse and mental illness? Yes        MEDICAL DECISION MAKING    ASSESSMENT        Alcohol Withdrawal Syndrome with Unspecified Complication   Alcohol Use Disorder, Severe, Dependence   Unspecified Depressive Disorder   Unspecified Insomnia        RECOMMENDATIONS       - Patient does not currently meet PEC criteria due to not being an imminent threat to " self/others and not being gravely disabled 2/2 mental illness at this time.      - Patient denies wanting to be on any scheduled antidepressant medication at this time (discussed risks/benefits/alt vs no treatment with patient).     - Can use Vistaril 25 mg to 50 mg PO TID PRN for anxiety and/or insomnia (discussed risks/benefits/alt vs no treatment with patient).    Alcohol Withdrawal Protocol: (in addition to scheduled Valium taper per Primary Team)  - Frequent assessment with the CIWA-Ar is the standard of care and the hallmark of clinical practice  - Clinical [place] Withdrawal Assessment of Alcohol Scale (CIWA-Ar)  If CIWA is <8 and vital signs are stable, no PRN medication is required. Repeat vital signs q4 and the CIWA q8.  If CIWA is between 8 and 15, give Lorazepam 2 mg PO/IM q4 PRN and complete vital signs q2 and the CIWA q4.  If CIWA is ?15 or DBP ?110 mmHg, give Lorazepam 2 mg PO/IM q1 until patient has a CIWA of <15 or DBP <110 mmHg. CIWA and vital signs checked q1 until patients CIWA is <15 and DBP <110 mmHg  Call MD for for SBP >180, DBP >120, or HR >110 or if patient requires ?6 mg of lorazepam in 3 hours.  - Vitals q4hr; Ativan 2mg PO/IM q4hr PRN, for CIWA >8 or if 2 criteria are met: Systolic BP>160, Diastolic BP >100 or HR >110  - Vitamin supplementation - Thiamine 100 mg daily, Folate 1 mg daily, & MVI daily     - Psychotherapy was performed with patient as noted above with a focus on improving alcohol & tobacco cessation / total sobriety, mood, and sleep.    - Patient's most recent resulted labs, imaging, and EKG were reviewed today.     - Please have Hospital CM/SW assist patient with admission to residential alcohol rehab (wants to pursue [facility] 28 day program).       - Patient was instructed to call 911 and/or [phone] and return to the nearest ED if he begins feeling suicidal, homicidal, or gravely disabled (for s/p this hospitalization).       - Thank you for this consult ; will  continue to follow patient while at [facility]         Total time spent with patient and/or managing/coordinating patient's care today (excluding the time spent on psychotherapy): 77 minutes   Time spent on psychotherapy today (as noted above): 20 minutes   Total time for encounter today including psychotherapy: 97 minutes      More than 50% of the time was spent counseling/coordinating care.     Consulting clinician was informed of the encounter and consult note.       STAFF:  Kerwin Ni MD  [facility]   8/15/2023

## 2023-08-15 NOTE — NURSING
Shift assessment. Pt is alert and oriented x 4. Pleasant. Pt would like some purple wipes and a sandwich. Pt verbalizes no needs/complaints at this time. Bed in lowest position, locked, and side rails up x 3. Bed alarm on. Call light in reach.

## 2023-08-15 NOTE — SUBJECTIVE & OBJECTIVE
Interval History:  Feeling a bit better today.  No longer having hallucinations or tremors.  Last p.r.n. Ativan dosage was yesterday afternoon.  Anticipate that if patient remains stable can likely discharge tomorrow.    Review of Systems   Psychiatric/Behavioral:  Negative for agitation, confusion and hallucinations.      Objective:     Vital Signs (Most Recent):  Temp: 98.1 °F (36.7 °C) (08/15/23 1121)  Pulse: 67 (08/15/23 1121)  Resp: 18 (08/15/23 1121)  BP: (!) 151/98 (08/15/23 1121)  SpO2: 99 % (08/15/23 1121) Vital Signs (24h Range):  Temp:  [97 °F (36.1 °C)-98.1 °F (36.7 °C)] 98.1 °F (36.7 °C)  Pulse:  [60-76] 67  Resp:  [17-20] 18  SpO2:  [96 %-100 %] 99 %  BP: (135-158)/(84-99) 151/98     Weight: 93.9 kg (207 lb 0.2 oz)  Body mass index is 29.7 kg/m².    Intake/Output Summary (Last 24 hours) at 8/15/2023 1155  Last data filed at 8/15/2023 0544  Gross per 24 hour   Intake 600.18 ml   Output --   Net 600.18 ml         Physical Exam  Vitals reviewed.   HENT:      Head: Normocephalic.      Right Ear: There is no impacted cerumen.      Left Ear: There is no impacted cerumen.      Nose: No congestion or rhinorrhea.      Mouth/Throat:      Pharynx: No oropharyngeal exudate or posterior oropharyngeal erythema.   Eyes:      General:         Right eye: No discharge.         Left eye: No discharge.   Cardiovascular:      Rate and Rhythm: Normal rate.      Heart sounds: No murmur heard.  Pulmonary:      Effort: No respiratory distress.      Breath sounds: No wheezing.   Abdominal:      General: There is no distension.      Tenderness: There is no abdominal tenderness.   Musculoskeletal:         General: No deformity.      Cervical back: No rigidity.      Right lower leg: No edema.   Lymphadenopathy:      Cervical: No cervical adenopathy.   Skin:     Findings: No bruising or lesion.   Neurological:      Mental Status: He is alert.      Cranial Nerves: No cranial nerve deficit.      Motor: No weakness.   Psychiatric:          Mood and Affect: Mood normal.             Significant Labs: All pertinent labs within the past 24 hours have been reviewed.    Significant Imaging: I have reviewed all pertinent imaging results/findings within the past 24 hours.

## 2023-08-15 NOTE — PLAN OF CARE
LEELA received call from Desirae with admissions at [facility] [phone]. Desirae reported that pt was approved to dc to there facility tomorrow. LEELA expressed understanding. LEELA will continue to follow pt throughout his transitions of care and assist with any dc needs.      08/15/23 7486   Post-Acute Status   Post-Acute Authorization Placement

## 2023-08-16 VITALS
SYSTOLIC BLOOD PRESSURE: 138 MMHG | HEART RATE: 77 BPM | DIASTOLIC BLOOD PRESSURE: 88 MMHG | RESPIRATION RATE: 18 BRPM | WEIGHT: 207 LBS | OXYGEN SATURATION: 100 % | TEMPERATURE: 99 F | HEIGHT: 70 IN | BODY MASS INDEX: 29.63 KG/M2

## 2023-08-16 LAB
ALBUMIN SERPL BCP-MCNC: 3.7 G/DL (ref 3.5–5.2)
ALP SERPL-CCNC: 50 U/L (ref 55–135)
ALT SERPL W/O P-5'-P-CCNC: 72 U/L (ref 10–44)
ANION GAP SERPL CALC-SCNC: 10 MMOL/L (ref 8–16)
AST SERPL-CCNC: 72 U/L (ref 10–40)
BASOPHILS # BLD AUTO: 0.06 K/UL (ref 0–0.2)
BASOPHILS NFR BLD: 0.7 % (ref 0–1.9)
BILIRUB SERPL-MCNC: 0.7 MG/DL (ref 0.1–1)
BUN SERPL-MCNC: 11 MG/DL (ref 6–20)
CALCIUM SERPL-MCNC: 9.3 MG/DL (ref 8.7–10.5)
CHLORIDE SERPL-SCNC: 107 MMOL/L (ref 95–110)
CO2 SERPL-SCNC: 24 MMOL/L (ref 23–29)
CREAT SERPL-MCNC: 1.1 MG/DL (ref 0.5–1.4)
DIFFERENTIAL METHOD: ABNORMAL
EOSINOPHIL # BLD AUTO: 0.2 K/UL (ref 0–0.5)
EOSINOPHIL NFR BLD: 2.2 % (ref 0–8)
ERYTHROCYTE [DISTWIDTH] IN BLOOD BY AUTOMATED COUNT: 12.7 % (ref 11.5–14.5)
EST. GFR  (NO RACE VARIABLE): >60 ML/MIN/1.73 M^2
GLUCOSE SERPL-MCNC: 106 MG/DL (ref 70–110)
HCT VFR BLD AUTO: 40.1 % (ref 40–54)
HGB BLD-MCNC: 13.6 G/DL (ref 14–18)
IMM GRANULOCYTES # BLD AUTO: 0.03 K/UL (ref 0–0.04)
IMM GRANULOCYTES NFR BLD AUTO: 0.4 % (ref 0–0.5)
LYMPHOCYTES # BLD AUTO: 3 K/UL (ref 1–4.8)
LYMPHOCYTES NFR BLD: 34.7 % (ref 18–48)
MCH RBC QN AUTO: 31.3 PG (ref 27–31)
MCHC RBC AUTO-ENTMCNC: 33.9 G/DL (ref 32–36)
MCV RBC AUTO: 92 FL (ref 82–98)
MONOCYTES # BLD AUTO: 0.8 K/UL (ref 0.3–1)
MONOCYTES NFR BLD: 8.9 % (ref 4–15)
NEUTROPHILS # BLD AUTO: 4.5 K/UL (ref 1.8–7.7)
NEUTROPHILS NFR BLD: 53.1 % (ref 38–73)
NRBC BLD-RTO: 0 /100 WBC
PLATELET # BLD AUTO: 203 K/UL (ref 150–450)
PMV BLD AUTO: 10.2 FL (ref 9.2–12.9)
POTASSIUM SERPL-SCNC: 3.9 MMOL/L (ref 3.5–5.1)
PROT SERPL-MCNC: 6.7 G/DL (ref 6–8.4)
RBC # BLD AUTO: 4.34 M/UL (ref 4.6–6.2)
SODIUM SERPL-SCNC: 141 MMOL/L (ref 136–145)
WBC # BLD AUTO: 8.53 K/UL (ref 3.9–12.7)

## 2023-08-16 PROCEDURE — 25000003 PHARM REV CODE 250: Performed by: STUDENT IN AN ORGANIZED HEALTH CARE EDUCATION/TRAINING PROGRAM

## 2023-08-16 PROCEDURE — 80053 COMPREHEN METABOLIC PANEL: CPT | Performed by: STUDENT IN AN ORGANIZED HEALTH CARE EDUCATION/TRAINING PROGRAM

## 2023-08-16 PROCEDURE — 36415 COLL VENOUS BLD VENIPUNCTURE: CPT | Performed by: STUDENT IN AN ORGANIZED HEALTH CARE EDUCATION/TRAINING PROGRAM

## 2023-08-16 PROCEDURE — G0378 HOSPITAL OBSERVATION PER HR: HCPCS

## 2023-08-16 PROCEDURE — 85025 COMPLETE CBC W/AUTO DIFF WBC: CPT | Performed by: STUDENT IN AN ORGANIZED HEALTH CARE EDUCATION/TRAINING PROGRAM

## 2023-08-16 PROCEDURE — S4991 NICOTINE PATCH NONLEGEND: HCPCS | Performed by: STUDENT IN AN ORGANIZED HEALTH CARE EDUCATION/TRAINING PROGRAM

## 2023-08-16 PROCEDURE — 63600175 PHARM REV CODE 636 W HCPCS: Performed by: STUDENT IN AN ORGANIZED HEALTH CARE EDUCATION/TRAINING PROGRAM

## 2023-08-16 PROCEDURE — 96372 THER/PROPH/DIAG INJ SC/IM: CPT | Performed by: STUDENT IN AN ORGANIZED HEALTH CARE EDUCATION/TRAINING PROGRAM

## 2023-08-16 RX ORDER — IBUPROFEN 200 MG
1 TABLET ORAL DAILY
Qty: 28 PATCH | Refills: 1 | Status: SHIPPED | OUTPATIENT
Start: 2023-08-16

## 2023-08-16 RX ORDER — HYDROXYZINE PAMOATE 25 MG/1
25 CAPSULE ORAL EVERY 4 HOURS PRN
Qty: 30 CAPSULE | Refills: 0 | Status: SHIPPED | OUTPATIENT
Start: 2023-08-16

## 2023-08-16 RX ORDER — FOLIC ACID 1 MG/1
1 TABLET ORAL DAILY
Qty: 30 TABLET | Refills: 11 | Status: SHIPPED | OUTPATIENT
Start: 2023-08-16 | End: 2024-08-15

## 2023-08-16 RX ORDER — DIAZEPAM 2 MG/1
TABLET ORAL
Qty: 46.5 TABLET | Refills: 0 | Status: SHIPPED | OUTPATIENT
Start: 2023-08-16 | End: 2023-08-23

## 2023-08-16 RX ORDER — LANOLIN ALCOHOL/MO/W.PET/CERES
100 CREAM (GRAM) TOPICAL DAILY
Qty: 30 TABLET | Refills: 11 | Status: SHIPPED | OUTPATIENT
Start: 2023-08-16 | End: 2024-08-15

## 2023-08-16 RX ADMIN — THERA TABS 1 TABLET: TAB at 10:08

## 2023-08-16 RX ADMIN — DIAZEPAM 10 MG: 5 TABLET ORAL at 05:08

## 2023-08-16 RX ADMIN — NICOTINE 1 PATCH: 21 PATCH, EXTENDED RELEASE TRANSDERMAL at 10:08

## 2023-08-16 RX ADMIN — FOLIC ACID 1 MG: 1 TABLET ORAL at 10:08

## 2023-08-16 RX ADMIN — HEPARIN SODIUM 5000 UNITS: 5000 INJECTION INTRAVENOUS; SUBCUTANEOUS at 05:08

## 2023-08-16 NOTE — PLAN OF CARE
LEELA communicated with Maria D with admissions at [facility] [phone]. Maria D reported that pt is approved to dc to [facility] today. Maria D reported that there transportation van will be heading to hospital to transport pt to facility today. Maria D reported that ETA on transport is 4/5hrs.     LEELA met with pt at bedside to discuss dc planning. Pt reports that he is agreeable to dc to [facility] today. Pt expressed that his mother will bring his belongings to hospital. Pt is aware that transport will take up to 4/5 hrs. Pt expressed understanding.     Transportation: [facility] 4/5 ETA  Facility: [facility].   Transport packet:At nursing station.     LEELA communicated with  Reuben anaya#  [phone] (Transportation). LEELA made aware that van is outside to pick pt up. LEELA notified nurses.     LEELA faxed facility transfer orders and hard script to [facility]. Desirae from facility confirmed that she received orders.     LEELA will continue to follow pt throughout his transitions of care and assist with any dc needs.      08/16/23 0926   Final Note   Assessment Type Final Discharge Note   Anticipated Discharge Disposition Rehab   Hospital Resources/Appts/Education Provided Appointments scheduled and added to AVS   Post-Acute Status   Discharge Delays None known at this time

## 2023-08-16 NOTE — DISCHARGE SUMMARY
"[facility] Medicine  Discharge Summary      Patient Name: Valencia Solorzano  MRN: [id number]  REX: [id number]  Patient Class: IP- Inpatient  Admission Date: 8/13/2023  Hospital Length of Stay: 2 days  Discharge Date and Time:  08/16/2023 10:43 AM  Attending Physician: Joseph Travis,*   Discharging Provider: Joseph Travis MD  Primary Care Provider: Luisa, Primary Doctor    Primary Care Team: Networked reference to record PCT     HPI:   Valencia Solorzano is a 32 yr male with alcohol use disorder who presents with tremors.    Patient states that his last drink was this morning.  He reports attempting to stop drinking several times over the past few years.  He drinks nearly a 5th of hard liquor a day.  He is currently complaining of nausea.  Patient often gets tremors when he attempts to stop although he has never had a seizure.  He states he wants to stop since he is hurting his body.  He comes in for further evaluation and care.    In the ED, triage vitals were unremarkable.  CBC showed no leukocytosis.  CMP was significant for hypernatremia, anion gap of 20.  Alk-phos was elevated as well as AST and ALT.    Medicine was consulted for patient's alcohol withdrawals.      * No surgery found *      Hospital Course:   Mr. Solorzano presented with acute alcohol withdrawal with tremors and hallucinations. Started on benzodiazepine taper, thiamine, MVI, and folate. Tremors have resolved and he has not required any PRNs for >24 hours. Medically stable for discharge to complete outpatient valium taper; will discharge to inpatient rehab for AUD.    /88 (BP Location: Left arm, Patient Position: Lying)   Pulse 77   Temp 98.7 °F (37.1 °C) (Oral)   Resp 18   Ht 5' 10" (1.778 m)   Wt 93.9 kg (207 lb 0.2 oz)   SpO2 100%   BMI 29.70 kg/m²   Physical Exam  Vitals reviewed.   HENT:      Head: Normocephalic.      Right Ear: There is no impacted cerumen.      Left Ear: There is no impacted cerumen. "      Nose: No congestion or rhinorrhea.      Mouth/Throat:      Pharynx: No oropharyngeal exudate or posterior oropharyngeal erythema.   Eyes:      General:         Right eye: No discharge.         Left eye: No discharge.   Cardiovascular:      Rate and Rhythm: Normal rate.      Heart sounds: No murmur heard.  Pulmonary:      Effort: No respiratory distress.      Breath sounds: No wheezing.   Abdominal:      General: There is no distension.      Tenderness: There is no abdominal tenderness.   Musculoskeletal:         General: No deformity.      Cervical back: No rigidity.      Right lower leg: No edema.   Lymphadenopathy:      Cervical: No cervical adenopathy.   Skin:     Findings: No bruising or lesion.   Neurological:      Mental Status: He is alert.      Cranial Nerves: No cranial nerve deficit.      Motor: No weakness.   Psychiatric:         Mood and Affect: Mood normal.           Goals of Care Treatment Preferences:  Code Status: Full Code      Consults:   Consults (From admission, onward)        Status Ordering Provider     Inpatient consult to Psychiatry  Once        Provider:  (Not yet assigned)    ANTONIA Shaw          No new Assessment & Plan notes have been filed under this hospital service since the last note was generated.  Service: Hospital Medicine    Final Active Diagnoses:    Diagnosis Date Noted POA    PRINCIPAL PROBLEM:  Alcohol withdrawal [F10.939] 08/14/2023 Yes     Chronic    Alcohol use disorder [F10.90] 08/14/2023 Yes    Elevated liver enzymes [R74.8] 08/14/2023 Yes    Alcohol use [Z78.9] 08/14/2023 Yes      Problems Resolved During this Admission:    Diagnosis Date Noted Date Resolved POA    Hypernatremia [E87.0] 08/14/2023 08/15/2023 Yes    Metabolic acidosis, increased anion gap [E87.29] 08/14/2023 08/15/2023 Yes       Discharged Condition: good    Disposition: Home or Self Care    Follow Up:    Patient Instructions:      Ambulatory referral/consult to Smoking Cessation  Program   Standing Status: Future   Referral Priority: Routine Referral Type: Consultation   Referral Reason: Specialty Services Required   Requested Specialty: CTTS   Number of Visits Requested: 1     Diet Adult Regular     Notify your health care provider if you experience any of the following:  temperature >100.4     Notify your health care provider if you experience any of the following:  persistent nausea and vomiting or diarrhea     Notify your health care provider if you experience any of the following:  severe uncontrolled pain     Notify your health care provider if you experience any of the following:  difficulty breathing or increased cough     Notify your health care provider if you experience any of the following:  severe persistent headache     Notify your health care provider if you experience any of the following:  persistent dizziness, light-headedness, or visual disturbances     Notify your health care provider if you experience any of the following:  increased confusion or weakness     Activity as tolerated       Significant Diagnostic Studies: N/A    Pending Diagnostic Studies:     None         Medications:  Reconciled Home Medications:      Medication List      START taking these medications    diazePAM 2 MG tablet  Commonly known as: VALIUM  Take 5 tablets (10 mg total) by mouth every 8 (eight) hours for 1 day, THEN 2.5 tablets (5 mg total) every 8 (eight) hours for 3 days, THEN 1 tablet (2 mg total) every 8 (eight) hours for 3 days.  Start taking on: August 16, 2023     folic acid 1 MG tablet  Commonly known as: FOLVITE  Take 1 tablet (1 mg total) by mouth once daily.     hydrOXYzine pamoate 25 MG Cap  Commonly known as: VISTARIL  Take 1 capsule (25 mg total) by mouth every 4 (four) hours as needed (anxiety/insomnia).     multivitamin Tab  Take 1 tablet by mouth once daily.     nicotine 21 mg/24 hr  Commonly known as: NICODERM CQ  Place 1 patch onto the skin once daily.     thiamine 100 MG  tablet  Take 1 tablet (100 mg total) by mouth once daily.            Indwelling Lines/Drains at time of discharge:   Lines/Drains/Airways     None                 Time spent on the discharge of patient: 33 minutes         Joseph Travis MD  Department of Hospital Medicine  [facility]

## 2023-08-16 NOTE — HOSPITAL COURSE
"Mr. Solorzano presented with acute alcohol withdrawal with tremors and hallucinations. Started on benzodiazepine taper, thiamine, MVI, and folate. Tremors have resolved and he has not required any PRNs for >24 hours. Medically stable for discharge to complete outpatient valium taper; will discharge to inpatient rehab for AUD.    /88 (BP Location: Left arm, Patient Position: Lying)   Pulse 77   Temp 98.7 °F (37.1 °C) (Oral)   Resp 18   Ht 5' 10" (1.778 m)   Wt 93.9 kg (207 lb 0.2 oz)   SpO2 100%   BMI 29.70 kg/m²   Physical Exam  Vitals reviewed.   HENT:      Head: Normocephalic.      Right Ear: There is no impacted cerumen.      Left Ear: There is no impacted cerumen.      Nose: No congestion or rhinorrhea.      Mouth/Throat:      Pharynx: No oropharyngeal exudate or posterior oropharyngeal erythema.   Eyes:      General:         Right eye: No discharge.         Left eye: No discharge.   Cardiovascular:      Rate and Rhythm: Normal rate.      Heart sounds: No murmur heard.  Pulmonary:      Effort: No respiratory distress.      Breath sounds: No wheezing.   Abdominal:      General: There is no distension.      Tenderness: There is no abdominal tenderness.   Musculoskeletal:         General: No deformity.      Cervical back: No rigidity.      Right lower leg: No edema.   Lymphadenopathy:      Cervical: No cervical adenopathy.   Skin:     Findings: No bruising or lesion.   Neurological:      Mental Status: He is alert.      Cranial Nerves: No cranial nerve deficit.      Motor: No weakness.   Psychiatric:         Mood and Affect: Mood normal.      "

## 2023-08-16 NOTE — NURSING
Shift assessment complete. Pt is alert and oriented x 4. Denies pain. No tremors. Pt would like a sandwich and go to bed. Bed in lowest position, locked, and  rails up x 3. Call light in reach.

## 2023-08-16 NOTE — PLAN OF CARE
[facility]    FACILITY TRANSFER ORDERS      Patient Name: Valencia Solorzano  YOB: 1990    PCP: No, Primary Doctor   PCP Address: None  PCP Phone Number: None  PCP Fax: None    Encounter Date: 08/16/2023    Admit to: inpatient rehab    Vital Signs:  Routine    Diagnoses:   Active Hospital Problems    Diagnosis  POA    *Alcohol withdrawal [F10.939]  Yes     Chronic    Alcohol use disorder [F10.90]  Yes    Elevated liver enzymes [R74.8]  Yes    Alcohol use [Z78.9]  Yes      Resolved Hospital Problems    Diagnosis Date Resolved POA    Hypernatremia [E87.0] 08/15/2023 Yes    Metabolic acidosis, increased anion gap [E87.29] 08/15/2023 Yes       Allergies:Review of patient's allergies indicates:  No Known Allergies    Diet: regular diet    Activities: Activity as tolerated    Goals of Care Treatment Preferences:  Code Status: Full Code      Nursing: per facility protocol     Labs: n/a    CONSULTS:     to evaluate for community resources/long-range planning.    MISCELLANEOUS CARE:  N/a    WOUND CARE ORDERS  None    Medications: Review discharge medications with patient and family and provide education.      Current Discharge Medication List        START taking these medications    Details   diazePAM (VALIUM) 2 MG tablet Take 5 tablets (10 mg total) by mouth every 8 (eight) hours for 1 day, THEN 2.5 tablets (5 mg total) every 8 (eight) hours for 3 days, THEN 1 tablet (2 mg total) every 8 (eight) hours for 3 days.  Qty: 46.5 tablet, Refills: 0      folic acid (FOLVITE) 1 MG tablet Take 1 tablet (1 mg total) by mouth once daily.  Qty: 30 tablet, Refills: 11      hydrOXYzine pamoate (VISTARIL) 25 MG Cap Take 1 capsule (25 mg total) by mouth every 4 (four) hours as needed (anxiety/insomnia).  Qty: 30 capsule, Refills: 0      multivitamin Tab Take 1 tablet by mouth once daily.  Qty: 30 tablet, Refills: 11      nicotine (NICODERM CQ) 21 mg/24 hr Place 1 patch onto the skin once daily.  Qty:  28 patch, Refills: 1      thiamine 100 MG tablet Take 1 tablet (100 mg total) by mouth once daily.  Qty: 30 tablet, Refills: 11                  _________________________________  Joseph Travis MD  08/16/2023

## 2023-08-16 NOTE — PLAN OF CARE
08/16/23 1110   AVS Confirmation   Discharge instructions and AVS given to and reviewed with patient and/or significant other. Yes           AVS printed and handed to patient by bedside nurse. VN reviewed discharge instructions with patient using teachback method.  Allowed time for questions, all questions answered.  Patient verbalized complete understanding of discharge instructions and voices no concerns. Discharge instructions complete. Bedside nurse notified.

## 2024-03-29 NOTE — PLAN OF CARE
Problem: Adult Inpatient Plan of Care  Goal: Plan of Care Review  Outcome: Ongoing, Progressing   VN note:   Patient's chart, labs and vital signs reviewed, will be available to intervene if needed.      no